# Patient Record
Sex: FEMALE | Race: WHITE | NOT HISPANIC OR LATINO | Employment: FULL TIME | URBAN - METROPOLITAN AREA
[De-identification: names, ages, dates, MRNs, and addresses within clinical notes are randomized per-mention and may not be internally consistent; named-entity substitution may affect disease eponyms.]

---

## 2017-03-31 ENCOUNTER — OFFICE VISIT (OUTPATIENT)
Dept: FAMILY MEDICINE | Facility: CLINIC | Age: 37
End: 2017-03-31
Payer: COMMERCIAL

## 2017-03-31 VITALS
DIASTOLIC BLOOD PRESSURE: 60 MMHG | BODY MASS INDEX: 24.59 KG/M2 | SYSTOLIC BLOOD PRESSURE: 94 MMHG | OXYGEN SATURATION: 96 % | HEART RATE: 63 BPM | TEMPERATURE: 97.9 F | RESPIRATION RATE: 16 BRPM | WEIGHT: 141 LBS

## 2017-03-31 DIAGNOSIS — K42.0 UMBILICAL HERNIA WITH OBSTRUCTION: ICD-10-CM

## 2017-03-31 DIAGNOSIS — L29.9 PRURITIC DISORDER: Primary | ICD-10-CM

## 2017-03-31 DIAGNOSIS — M62.08 RECTUS DIASTASIS: ICD-10-CM

## 2017-03-31 PROCEDURE — 99214 OFFICE O/P EST MOD 30 MIN: CPT | Performed by: FAMILY MEDICINE

## 2017-03-31 RX ORDER — KETOCONAZOLE 20 MG/G
CREAM TOPICAL 2 TIMES DAILY
Qty: 15 G | Refills: 1 | Status: SHIPPED | OUTPATIENT
Start: 2017-03-31 | End: 2017-07-11

## 2017-03-31 ASSESSMENT — ENCOUNTER SYMPTOMS
DIARRHEA: 0
CONSTITUTIONAL NEGATIVE: 1
CONSTIPATION: 0
ABDOMINAL PAIN: 1
BLOOD IN STOOL: 0
BACK PAIN: 0

## 2017-03-31 NOTE — MR AVS SNAPSHOT
After Visit Summary   3/31/2017    Keisha Espinoza    MRN: [id number]           Patient Information     Date Of Birth          1980        Visit Information        Provider Department      3/31/2017 10:40 AM Eric Wilson MD [facility]        Today's Diagnoses     Pruritic disorder    -  1    Rectus diastasis        Umbilical hernia with obstruction           Follow-ups after your visit        Follow-up notes from your care team     Return in about 1 month (around 4/30/2017).      Who to contact     If you have questions or need follow up information about today's clinic visit or your schedule please contact [facility] directly at [phone].  Normal or non-critical lab and imaging results will be communicated to you by MyChart, letter or phone within 4 business days after the clinic has received the results. If you do not hear from us within 7 days, please contact the clinic through Aternity[employer] or phone. If you have a critical or abnormal lab result, we will notify you by phone as soon as possible.  Submit refill requests through [employer] or call your pharmacy and they will forward the refill request to us. Please allow 3 business days for your refill to be completed.          Additional Information About Your Visit        MyChart Information     [employer] gives you secure access to your electronic health record. If you see a primary care provider, you can also send messages to your care team and make appointments. If you have questions, please call your primary care clinic.  If you do not have a primary care provider, please call [phone] and they will assist you.        Care EveryWhere ID     This is your Care EveryWhere ID. This could be used by other organizations to access your [place] medical records  [id number]        Your Vitals Were     Pulse Temperature Respirations Pulse Oximetry Breastfeeding? BMI (Body Mass Index)    63 97.9  F (36.6  C)  (Oral) 16 96% Yes 24.59 kg/m2       Blood Pressure from Last 3 Encounters:   03/31/17 94/60   12/02/16 100/58   11/29/16 102/62    Weight from Last 3 Encounters:   03/31/17 141 lb (64 kg)   12/02/16 141 lb 3.2 oz (64 kg)   11/29/16 140 lb (63.5 kg)              Today, you had the following     No orders found for display         Today's Medication Changes          These changes are accurate as of: 3/31/17 11:10 AM.  If you have any questions, ask your nurse or doctor.               Start taking these medicines.        Dose/Directions    ketoconazole 2 % cream   Commonly known as:  NIZORAL   Used for:  Pruritic disorder   Started by:  Eric Wilson MD        Apply topically 2 times daily   Quantity:  15 g   Refills:  1            Where to get your medicines      These medications were sent to [facility]/pharmacy #[id number] - [place], [place] - [address]  [address]   [address] [facility] [address]     Phone:  [phone]     ketoconazole 2 % cream                Primary Care Provider Office Phone # Fax #    Eric Wilson [phone]       [facility] [address] [facility] [address]        Thank you!     Thank you for choosing [facility]  for your care. Our goal is always to provide you with excellent care. Hearing back from our patients is one way we can continue to improve our services. Please take a few minutes to complete the written survey that you may receive in the mail after your visit with us. Thank you!             Your Updated Medication List - Protect others around you: Learn how to safely use, store and throw away your medicines at www.disposemymeds.org.          This list is accurate as of: 3/31/17 11:10 AM.  Always use your most recent med list.                   Brand Name Dispense Instructions for use    breast pump Misc     1 each    1 each daily       ketoconazole 2 % cream    NIZORAL    15 g    Apply topically 2 times daily        Potassium Chloride ER 20 MEQ Tbcr     30 tablet    Take 1 tablet (20 mEq) by mouth daily       PRENATAL VITAMIN PO      Take 1 tablet by mouth.

## 2017-03-31 NOTE — PROGRESS NOTES
HPI    SUBJECTIVE:                                                    Keisha Espinoza is a 36 year old female who presents to clinic today for the following health issues:      Musculoskeletal problem/pain      Duration: greater than 6 months    Description  Location: bottom of right foot    Intensity:  mild    Accompanying signs and symptoms: extreme itching, especially at night time, that wont go away.    History  Previous similar problem: no   Previous evaluation:  none    Precipitating or alleviating factors:  Trauma or overuse: no   Aggravating factors include: none    Therapies tried and outcome: coconut oil, lotions     Patient has concern about umbilical since giving birth 6 months ago.    Does not see any sort of rash or eloy.  Only happens at night.  First noticed while pregnant, but feels it's been going on since before that.  Only this one spot on this one foot.  Has been using coconut oil, not helpful.    Now has an outie since delivery, firm lump.  No pain in umbilicus, change in bowel habits, bloddy stool.  Second delivery    Review of Systems   Constitutional: Negative.    Gastrointestinal: Positive for abdominal pain. Negative for blood in stool, constipation, diarrhea and melena.   Genitourinary: Negative.    Musculoskeletal: Negative for back pain.   Skin: Positive for rash. Negative for itching.         Physical Exam   Constitutional: She is oriented to person, place, and time and well-developed, well-nourished, and in no distress.   Abdominal: Normal appearance and bowel sounds are normal. There is no tenderness. No hernia. Hernia confirmed negative in the ventral area, confirmed negative in the right inguinal area and confirmed negative in the left inguinal area.   <1cm umbilical hernia with easily reducible omental protrusion.   Neurological: She is alert and oriented to person, place, and time.   Skin: Skin is warm and dry.   No illumination with woods lamp on sole of feet.  No lesion, rash    Vitals reviewed.    (L29.9) Pruritic disorder  (primary encounter diagnosis)  Comment: f not helpful will tiral steroid  Plan: ketoconazole (NIZORAL) 2 % cream            (M62.08) Rectus diastasis  Comment: post pregnancy, unmaksed small umbilical hernia  Plan:     (K42.0) Umbilical hernia with obstruction  Comment: minor, likely unmasked by diasthesis  Plan: observe      RTC in 1m    Eric Wilson MD

## 2017-04-10 ENCOUNTER — OFFICE VISIT (OUTPATIENT)
Dept: FAMILY MEDICINE | Facility: CLINIC | Age: 37
End: 2017-04-10
Payer: COMMERCIAL

## 2017-04-10 VITALS
WEIGHT: 141 LBS | DIASTOLIC BLOOD PRESSURE: 64 MMHG | RESPIRATION RATE: 16 BRPM | SYSTOLIC BLOOD PRESSURE: 100 MMHG | HEART RATE: 68 BPM | TEMPERATURE: 97.9 F | OXYGEN SATURATION: 97 % | BODY MASS INDEX: 24.59 KG/M2

## 2017-04-10 DIAGNOSIS — R11.0 NAUSEA: Primary | ICD-10-CM

## 2017-04-10 LAB
ALBUMIN UR-MCNC: NEGATIVE MG/DL
APPEARANCE UR: CLEAR
BASOPHILS # BLD AUTO: 0 10E9/L (ref 0–0.2)
BASOPHILS NFR BLD AUTO: 0.5 %
BETA HCG QUAL IFA URINE: NEGATIVE
BILIRUB UR QL STRIP: NEGATIVE
COLOR UR AUTO: YELLOW
DIFFERENTIAL METHOD BLD: NORMAL
EOSINOPHIL # BLD AUTO: 0.6 10E9/L (ref 0–0.7)
EOSINOPHIL NFR BLD AUTO: 7.7 %
ERYTHROCYTE [DISTWIDTH] IN BLOOD BY AUTOMATED COUNT: 11.2 % (ref 10–15)
GLUCOSE UR STRIP-MCNC: NEGATIVE MG/DL
HCT VFR BLD AUTO: 40.9 % (ref 35–47)
HGB BLD-MCNC: 13.7 G/DL (ref 11.7–15.7)
HGB UR QL STRIP: NEGATIVE
KETONES UR STRIP-MCNC: NEGATIVE MG/DL
LEUKOCYTE ESTERASE UR QL STRIP: NEGATIVE
LYMPHOCYTES # BLD AUTO: 2.3 10E9/L (ref 0.8–5.3)
LYMPHOCYTES NFR BLD AUTO: 30.5 %
MCH RBC QN AUTO: 30.9 PG (ref 26.5–33)
MCHC RBC AUTO-ENTMCNC: 33.5 G/DL (ref 31.5–36.5)
MCV RBC AUTO: 92 FL (ref 78–100)
MONOCYTES # BLD AUTO: 0.5 10E9/L (ref 0–1.3)
MONOCYTES NFR BLD AUTO: 6.9 %
NEUTROPHILS # BLD AUTO: 4.1 10E9/L (ref 1.6–8.3)
NEUTROPHILS NFR BLD AUTO: 54.4 %
NITRATE UR QL: NEGATIVE
PH UR STRIP: 7 PH (ref 5–7)
PLATELET # BLD AUTO: 224 10E9/L (ref 150–450)
RBC # BLD AUTO: 4.44 10E12/L (ref 3.8–5.2)
SP GR UR STRIP: 1.01 (ref 1–1.03)
URN SPEC COLLECT METH UR: NORMAL
UROBILINOGEN UR STRIP-ACNC: 0.2 EU/DL (ref 0.2–1)
WBC # BLD AUTO: 7.5 10E9/L (ref 4–11)

## 2017-04-10 PROCEDURE — 85025 COMPLETE CBC W/AUTO DIFF WBC: CPT | Performed by: FAMILY MEDICINE

## 2017-04-10 PROCEDURE — 36415 COLL VENOUS BLD VENIPUNCTURE: CPT | Performed by: FAMILY MEDICINE

## 2017-04-10 PROCEDURE — 81003 URINALYSIS AUTO W/O SCOPE: CPT | Performed by: FAMILY MEDICINE

## 2017-04-10 PROCEDURE — 80048 BASIC METABOLIC PNL TOTAL CA: CPT | Performed by: FAMILY MEDICINE

## 2017-04-10 PROCEDURE — 84703 CHORIONIC GONADOTROPIN ASSAY: CPT | Performed by: FAMILY MEDICINE

## 2017-04-10 PROCEDURE — 99213 OFFICE O/P EST LOW 20 MIN: CPT | Performed by: FAMILY MEDICINE

## 2017-04-10 ASSESSMENT — ENCOUNTER SYMPTOMS
FEVER: 0
BACK PAIN: 0
HEADACHES: 1
DIARRHEA: 0
BLOOD IN STOOL: 0
VOMITING: 0
COUGH: 0
CONSTIPATION: 0
NAUSEA: 1
ABDOMINAL PAIN: 0

## 2017-04-10 NOTE — MR AVS SNAPSHOT
After Visit Summary   4/10/2017    Keisha Espinoza    MRN: [id number]           Patient Information     Date Of Birth          1980        Visit Information        Provider Department      4/10/2017 7:40 AM Eric Wilson MD [facility]        Today's Diagnoses     Nausea    -  1       Follow-ups after your visit        Follow-up notes from your care team     Return in about 2 weeks (around 4/24/2017), or if symptoms worsen or fail to improve.      Who to contact     If you have questions or need follow up information about today's clinic visit or your schedule please contact [facility] directly at [phone].  Normal or non-critical lab and imaging results will be communicated to you by MyChart, letter or phone within 4 business days after the clinic has received the results. If you do not hear from us within 7 days, please contact the clinic through [employer] or phone. If you have a critical or abnormal lab result, we will notify you by phone as soon as possible.  Submit refill requests through [employer] or call your pharmacy and they will forward the refill request to us. Please allow 3 business days for your refill to be completed.          Additional Information About Your Visit        MyChart Information     [employer] gives you secure access to your electronic health record. If you see a primary care provider, you can also send messages to your care team and make appointments. If you have questions, please call your primary care clinic.  If you do not have a primary care provider, please call [phone] and they will assist you.        Care EveryWhere ID     This is your Care EveryWhere ID. This could be used by other organizations to access your [place] medical records  [id number]        Your Vitals Were     Pulse Temperature Respirations Pulse Oximetry Breastfeeding? BMI (Body Mass Index)    68 97.9  F (36.6  C) (Oral) 16 97% Yes 24.59 kg/m2        Blood Pressure from Last 3 Encounters:   04/10/17 100/64   03/31/17 94/60   12/02/16 100/58    Weight from Last 3 Encounters:   04/10/17 141 lb (64 kg)   03/31/17 141 lb (64 kg)   12/02/16 141 lb 3.2 oz (64 kg)              We Performed the Following     *UA reflex to Microscopic     Basic metabolic panel     Beta HCG qual IFA urine     CBC with platelets and differential        Primary Care Provider Office Phone # Fax #    Eric Wilson [phone]       [facility] [address]  [facility] [address]        Thank you!     Thank you for choosing [facility]  for your care. Our goal is always to provide you with excellent care. Hearing back from our patients is one way we can continue to improve our services. Please take a few minutes to complete the written survey that you may receive in the mail after your visit with us. Thank you!             Your Updated Medication List - Protect others around you: Learn how to safely use, store and throw away your medicines at www.disposemymeds.org.          This list is accurate as of: 4/10/17  8:41 AM.  Always use your most recent med list.                   Brand Name Dispense Instructions for use    breast pump Misc     1 each    1 each daily       ketoconazole 2 % cream    NIZORAL    15 g    Apply topically 2 times daily       Potassium Chloride ER 20 MEQ Tbcr     30 tablet    Take 1 tablet (20 mEq) by mouth daily       PRENATAL VITAMIN PO      Take 1 tablet by mouth.

## 2017-04-10 NOTE — PROGRESS NOTES
HPI    SUBJECTIVE:                                                    Keisha Espinoza is a 37 year old female who presents to clinic today for the following health issues:      NAUSEA/DIZZINESS      Duration: couple weeks    Description (location/character/radiation): has been feeling nauseous, dizziness, stomach feels funny, jittery; more noticeable in the middle of the night when getting up with little one, and first thing in the morning     Intensity:  moderate    Accompanying signs and symptoms: weird feeling in head    History (similar episodes/previous evaluation): after giving birth    Precipitating or alleviating factors: None    Therapies tried and outcome: tylenol     Feeling poorly, nausea, HA, fatigue.  Feels a bit jittery.  No fever, diarrhea.  Up 1-2 times nightly with baby.  Nursing.  No period since delivery.  Using condoms for birth control.  Had a similar episode shortly after birth of sone last fall, feels this is worse.  In the last several days symtpoms are pretty stable.  No vaginal discahrge.  Has had flu shot this season.    Mood is fine - no concerns about depression, anxiety.    Review of Systems   Constitutional: Positive for malaise/fatigue. Negative for fever.   HENT: Negative for congestion.    Respiratory: Negative for cough.    Gastrointestinal: Positive for nausea. Negative for abdominal pain, blood in stool, constipation, diarrhea and vomiting.   Genitourinary: Negative.    Musculoskeletal: Negative for back pain.   Neurological: Positive for headaches.         Physical Exam   Constitutional: She is oriented to person, place, and time and well-developed, well-nourished, and in no distress.   Eyes: Conjunctivae and EOM are normal. Pupils are equal, round, and reactive to light.   Cardiovascular: Normal rate, regular rhythm and normal heart sounds.    Pulmonary/Chest: Effort normal and breath sounds normal.   Musculoskeletal: She exhibits no edema.   Neurological: She is alert and  oriented to person, place, and time. She has intact cranial nerves.   Skin: Skin is warm and dry.   Vitals reviewed.    (R11.0) Nausea  (primary encounter diagnosis)  Comment: suspect viral, likley mild flu.  Cont obs and supportive cares  Plan: Beta HCG qual IFA urine, Basic metabolic panel,        CBC with platelets and differential, *UA reflex        to Microscopic              RTC in 2w    Eric Wilson MD

## 2017-04-11 LAB
ANION GAP SERPL CALCULATED.3IONS-SCNC: 7 MMOL/L (ref 3–14)
BUN SERPL-MCNC: 15 MG/DL (ref 7–30)
CALCIUM SERPL-MCNC: 9.3 MG/DL (ref 8.5–10.1)
CHLORIDE SERPL-SCNC: 107 MMOL/L (ref 94–109)
CO2 SERPL-SCNC: 28 MMOL/L (ref 20–32)
CREAT SERPL-MCNC: 0.72 MG/DL (ref 0.52–1.04)
GFR SERPL CREATININE-BSD FRML MDRD: ABNORMAL ML/MIN/1.7M2
GLUCOSE SERPL-MCNC: 65 MG/DL (ref 70–99)
POTASSIUM SERPL-SCNC: 4 MMOL/L (ref 3.4–5.3)
SODIUM SERPL-SCNC: 142 MMOL/L (ref 133–144)

## 2017-04-18 ENCOUNTER — TELEPHONE (OUTPATIENT)
Dept: OBGYN | Facility: CLINIC | Age: 37
End: 2017-04-18

## 2017-04-19 ENCOUNTER — OFFICE VISIT (OUTPATIENT)
Dept: FAMILY MEDICINE | Facility: CLINIC | Age: 37
End: 2017-04-19
Payer: COMMERCIAL

## 2017-04-19 VITALS
TEMPERATURE: 98.2 F | RESPIRATION RATE: 14 BRPM | WEIGHT: 144 LBS | SYSTOLIC BLOOD PRESSURE: 94 MMHG | DIASTOLIC BLOOD PRESSURE: 62 MMHG | BODY MASS INDEX: 25.11 KG/M2 | OXYGEN SATURATION: 97 % | HEART RATE: 66 BPM

## 2017-04-19 DIAGNOSIS — K42.9 UMBILICAL HERNIA WITHOUT OBSTRUCTION AND WITHOUT GANGRENE: Primary | ICD-10-CM

## 2017-04-19 PROCEDURE — 99213 OFFICE O/P EST LOW 20 MIN: CPT | Performed by: FAMILY MEDICINE

## 2017-04-19 ASSESSMENT — ENCOUNTER SYMPTOMS
NAUSEA: 0
VOMITING: 0
DIARRHEA: 0
CARDIOVASCULAR NEGATIVE: 1
RESPIRATORY NEGATIVE: 1
ABDOMINAL PAIN: 1
CONSTITUTIONAL NEGATIVE: 1
CONSTIPATION: 0

## 2017-04-19 NOTE — TELEPHONE ENCOUNTER
Patient would like to see if Yamile can work patient in to be seen soon. It's been 6 months since she gave birth and it is still painful down there. She states it is painful to have intercourse. Please call to discuss.    Dana KISER  Central Scheduler

## 2017-04-19 NOTE — MR AVS SNAPSHOT
After Visit Summary   4/19/2017    Keisha Espinoza    MRN: [id number]           Patient Information     Date Of Birth          1980        Visit Information        Provider Department      4/19/2017 2:00 PM Eric Wilson MD [facility]        Today's Diagnoses     Umbilical hernia without obstruction and without gangrene    -  1       Follow-ups after your visit        Additional Services     GENERAL SURG ADULT REFERRAL       Your provider has referred you to: [facility]: [facility] [place] [phone]   [email or web address]    Please be aware that coverage of these services is subject to the terms and limitations of your health insurance plan.  Call member services at your health plan with any benefit or coverage questions.      Please bring the following with you to your appointment:    (1) Any X-Rays, CTs or MRIs which have been performed.  Contact the facility where they were done to arrange for  prior to your scheduled appointment.   (2) List of current medications   (3) This referral request   (4) Any documents/labs given to you for this referral                  Your next 10 appointments already scheduled     Apr 26, 2017  8:30 AM CDT   SHORT with Tho Cunha MD   [facility] ([facility])    [address] [place] [id number] [phone]              Who to contact     If you have questions or need follow up information about today's clinic visit or your schedule please contact [facility] directly at [phone].  Normal or non-critical lab and imaging results will be communicated to you by MyChart, letter or phone within 4 business days after the clinic has received the results. If you do not hear from us within 7 days, please contact the clinic through MyChart or phone. If you have a critical or abnormal lab result, we will  notify you by phone as soon as possible.  Submit refill requests through [employer] or call your pharmacy and they will forward the refill request to us. Please allow 3 business days for your refill to be completed.          Additional Information About Your Visit        [employer] Information     [employer] gives you secure access to your electronic health record. If you see a primary care provider, you can also send messages to your care team and make appointments. If you have questions, please call your primary care clinic.  If you do not have a primary care provider, please call [phone] and they will assist you.        Care EveryWhere ID     This is your Care EveryWhere ID. This could be used by other organizations to access your [place] medical records  [id number]        Your Vitals Were     Pulse Temperature Respirations Pulse Oximetry Breastfeeding? BMI (Body Mass Index)    66 98.2  F (36.8  C) (Oral) 14 97% Yes 25.11 kg/m2       Blood Pressure from Last 3 Encounters:   04/19/17 94/62   04/10/17 100/64   03/31/17 94/60    Weight from Last 3 Encounters:   04/19/17 144 lb (65.3 kg)   04/10/17 141 lb (64 kg)   03/31/17 141 lb (64 kg)              We Performed the Following     GENERAL SURG ADULT REFERRAL        Primary Care Provider Office Phone # Fax #    Eric Michael Wilson [phone]       [facility] [address]  [address] [facility] [address]        Thank you!     Thank you for choosing [facility]  for your care. Our goal is always to provide you with excellent care. Hearing back from our patients is one way we can continue to improve our services. Please take a few minutes to complete the written survey that you may receive in the mail after your visit with us. Thank you!             Your Updated Medication List - Protect others around you: Learn how to safely use, store and throw away your medicines at www.disposemymeds.org.          This list is accurate as of:  4/19/17  2:43 PM.  Always use your most recent med list.                   Brand Name Dispense Instructions for use    breast pump Misc     1 each    1 each daily       ketoconazole 2 % cream    NIZORAL    15 g    Apply topically 2 times daily       Potassium Chloride ER 20 MEQ Tbcr     30 tablet    Take 1 tablet (20 mEq) by mouth daily       PRENATAL VITAMIN PO      Take 1 tablet by mouth.

## 2017-04-19 NOTE — PROGRESS NOTES
HPI    SUBJECTIVE:                                                    Keisha Espinoza is a 37 year old female who presents to clinic today for the following health issues:      Recheck on abdominal pain, uncomfortable feeling with nursing, an irritating feeling, tenderness; no throbbing pain.  No periods since delivery.  No vaginal discharge.  Some dyspareunia, sensitive when washing.  Some entroitus pain with intercourse.  Does feel this is linked to umbilical hernia.  No change in bowel habits.        Review of Systems   Constitutional: Negative.    Respiratory: Negative.    Cardiovascular: Negative.    Gastrointestinal: Positive for abdominal pain. Negative for constipation, diarrhea, nausea and vomiting.         Physical Exam   Constitutional: She is well-developed, well-nourished, and in no distress.   Abdominal: Normal appearance and bowel sounds are normal. There is no tenderness. There is no CVA tenderness.   3-4 mm umbilical hernia, tender.  Also rectus diathesis.   Skin: Skin is warm and dry.   Vitals reviewed.    (K42.9) Umbilical hernia without obstruction and without gangrene  (primary encounter diagnosis)  Comment:   Plan: GENERAL SURG ADULT REFERRAL              RTC in 1m    Eric Wilson MD

## 2017-04-19 NOTE — NURSING NOTE
"Chief Complaint   Patient presents with     RECHECK     abdominal pain       Initial BP 94/62 (BP Location: Right arm, Patient Position: Chair, Cuff Size: Adult Regular)  Pulse 66  Temp 98.2  F (36.8  C) (Oral)  Resp 14  Wt 144 lb (65.3 kg)  SpO2 97%  Breastfeeding? Yes  BMI 25.11 kg/m2 Estimated body mass index is 25.11 kg/(m^2) as calculated from the following:    Height as of 12/2/16: 5' 3.5\" (1.613 m).    Weight as of this encounter: 144 lb (65.3 kg).  Medication Reconciliation: complete   Pricila Groves MA      "

## 2017-04-24 ENCOUNTER — OFFICE VISIT (OUTPATIENT)
Dept: SURGERY | Facility: CLINIC | Age: 37
End: 2017-04-24
Payer: COMMERCIAL

## 2017-04-24 VITALS
DIASTOLIC BLOOD PRESSURE: 78 MMHG | WEIGHT: 141 LBS | OXYGEN SATURATION: 96 % | HEIGHT: 64 IN | SYSTOLIC BLOOD PRESSURE: 122 MMHG | BODY MASS INDEX: 24.07 KG/M2 | HEART RATE: 66 BPM

## 2017-04-24 DIAGNOSIS — K42.9 UMBILICAL HERNIA WITHOUT OBSTRUCTION AND WITHOUT GANGRENE: Primary | ICD-10-CM

## 2017-04-24 DIAGNOSIS — M62.08 RECTUS DIASTASIS: ICD-10-CM

## 2017-04-24 PROCEDURE — 99202 OFFICE O/P NEW SF 15 MIN: CPT | Performed by: SURGERY

## 2017-04-24 NOTE — PROGRESS NOTES
Keisha is a 37 year old White female who presents for hernia evaluation. The patient has noticed a bulge. Pain has been present. This hernia began during her last pregnancy . She delivered 7 months ago and is currently breastfeeding. No definite plans for future pregnancy.    Pt has not had previous ABD surgery including none.  Patient does not report that increased activity/lifting causes pain. Employment does not require lifting.    ConstipationNo  DysuriaNo  CoughNo  Smoking No,quit  DiabetesNo    Pt's chart has been reviewed for PMH, PSH, allergies, medications and social history.    ROS:  Pulm:  No shortness of breath, dyspnea on exertion, cough, or hemoptysis  CV:  negative  ABD:  See chief complaint  :  negative    Physical exam:  Patient able to get up on table without difficulty.  Head eyes, nose and mouth within normal limits.  Sclera are clear  No supraclavicular or cervical adenopathy noted.  Neck shows no gross mass  Respirations are regular and non labored  Abdomen is abdomen is soft without significant tenderness, masses, organomegaly or guarding  bowel sounds are positive and no caput medusa noted. Supra-umbilical piercing site  Hernia  Is present at the umbilicus. Reducible and minimally tender fat present. Significant diastasis with no fascial defect/hernia    Imaging  CT No      Assesment: umbilical hernia, rectus diastasis  Plan: Discussed observation, external support, possible progression, incarceration and strangulation signs and symptoms and need for immediate treatment if they develop.  Discussed surgery in detail, including risk, benefits, complications, incision/cosmetics, mesh, infection (possibly requiring removal of the mesh), chronic pain, involvement of inta-abdominal organs, lifting and activity limits after surgery. Gave literature to review. Will schedule surgery in near future  Time spent with the patient with greater that 50% of the time in discussion was 20-30  minutes.    René Carrasquillo MD  4/24/2017 2:57 PM    Please route or send letter to:  Primary Care Provider (PCP) and Include Progress Note

## 2017-04-24 NOTE — MR AVS SNAPSHOT
After Visit Summary   4/24/2017    Keisha Espinoza    MRN: [id number]           Patient Information     Date Of Birth          1980        Visit Information        Provider Department      4/24/2017 2:30 PM René Carrasquillo MD [facility]      Today's Diagnoses     Umbilical hernia without obstruction and without gangrene    -  1    Rectus diastasis           Follow-ups after your visit        Your next 10 appointments already scheduled     Apr 26, 2017  8:30 AM CDT   SHORT with Tho Cunha MD   [facility] ([facility])    [address] [place] [address] [address]  [facility] [address] [phone]              Who to contact     If you have questions or need follow up information about today's clinic visit or your schedule please contact [facility] directly at [phone].  Normal or non-critical lab and imaging results will be communicated to you by MyChart, letter or phone within 4 business days after the clinic has received the results. If you do not hear from us within 7 days, please contact the clinic through MobPartner[employer] or phone. If you have a critical or abnormal lab result, we will notify you by phone as soon as possible.  Submit refill requests through [employer] or call your pharmacy and they will forward the refill request to us. Please allow 3 business days for your refill to be completed.          Additional Information About Your Visit        MyChart Information     [employer] gives you secure access to your electronic health record. If you see a primary care provider, you can also send messages to your care team and make appointments. If you have questions, please call your primary care clinic.  If you do not have a primary care provider, please call [phone] and they will assist you.        Care EveryWhere ID     This is your Care EveryWhere ID. This could be used by  "other organizations to access your [facility] medical records  [id number]        Your Vitals Were     Pulse Height Pulse Oximetry BMI (Body Mass Index)          66 5' 4\" (1.626 m) 96% 24.2 kg/m2         Blood Pressure from Last 3 Encounters:   04/24/17 122/78   04/19/17 94/62   04/10/17 100/64    Weight from Last 3 Encounters:   04/24/17 141 lb (64 kg)   04/19/17 144 lb (65.3 kg)   04/10/17 141 lb (64 kg)              Today, you had the following     No orders found for display       Primary Care Provider Office Phone # Fax #    Eric Wilson [phone]       [facility] [address]  [place] [facility] [address]        Thank you!     Thank you for choosing [facility]  for your care. Our goal is always to provide you with excellent care. Hearing back from our patients is one way we can continue to improve our services. Please take a few minutes to complete the written survey that you may receive in the mail after your visit with us. Thank you!             Your Updated Medication List - Protect others around you: Learn how to safely use, store and throw away your medicines at www.disposemymeds.org.          This list is accurate as of: 4/24/17  3:04 PM.  Always use your most recent med list.                   Brand Name Dispense Instructions for use    breast pump Misc     1 each    1 each daily       ketoconazole 2 % cream    NIZORAL    15 g    Apply topically 2 times daily       Potassium Chloride ER 20 MEQ Tbcr     30 tablet    Take 1 tablet (20 mEq) by mouth daily       PRENATAL VITAMIN PO      Take 1 tablet by mouth.         "

## 2017-04-24 NOTE — LETTER
2017    RE:  Keisha Espinoza-:  [birth year]    Keisha is a 37 year old White female who presents for hernia evaluation. The patient has noticed a bulge. Pain has been present. This hernia began during her last pregnancy . She delivered 7 months ago and is currently breastfeeding. No definite plans for future pregnancy.  Pt has not had previous ABD surgery including none. Patient does not report that increased activity/lifting causes pain. Employment does not require lifting.     ConstipationNo  DysuriaNo  CoughNo  Smoking No,quit  DiabetesNo     Pt's chart has been reviewed for PMH, PSH, allergies, medications and social history.     ROS:  Pulm: No shortness of breath, dyspnea on exertion, cough, or hemoptysis  CV: negative  ABD: See chief complaint  : negative     Physical exam: Patient able to get up on table without difficulty.  Head eyes, nose and mouth within normal limits.  Sclera are clear  No supraclavicular or cervical adenopathy noted.  Neck shows no gross mass  Respirations are regular and non labored  Abdomen is abdomen is soft without significant tenderness, masses, organomegaly or guarding bowel sounds are positive and no caput medusa noted. Supra-umbilical piercing site  Hernia Is present at the umbilicus. Reducible and minimally tender fat present. Significant diastasis with no fascial defect/hernia     Imaging CT No     Assesment: umbilical hernia, rectus diastasis  Plan: Discussed observation, external support, possible progression, incarceration and strangulation signs and symptoms and need for immediate treatment if they develop.  Discussed surgery in detail, including risk, benefits, complications, incision/cosmetics, mesh, infection (possibly requiring removal of the mesh), chronic pain, involvement of inta-abdominal organs, lifting and activity limits after surgery. Gave literature to review. Will schedule surgery in near future       René Carrasquillo MD

## 2017-04-26 ENCOUNTER — OFFICE VISIT (OUTPATIENT)
Dept: OBGYN | Facility: CLINIC | Age: 37
End: 2017-04-26
Payer: COMMERCIAL

## 2017-04-26 VITALS
DIASTOLIC BLOOD PRESSURE: 68 MMHG | WEIGHT: 141 LBS | SYSTOLIC BLOOD PRESSURE: 106 MMHG | BODY MASS INDEX: 24.2 KG/M2 | HEART RATE: 84 BPM

## 2017-04-26 DIAGNOSIS — N95.2 ATROPHIC VAGINITIS: Primary | ICD-10-CM

## 2017-04-26 DIAGNOSIS — R10.2 VAGINAL PAIN: ICD-10-CM

## 2017-04-26 LAB
MICRO REPORT STATUS: ABNORMAL
SPECIMEN SOURCE: ABNORMAL
WET PREP SPEC: ABNORMAL

## 2017-04-26 PROCEDURE — 99213 OFFICE O/P EST LOW 20 MIN: CPT | Performed by: OBSTETRICS & GYNECOLOGY

## 2017-04-26 PROCEDURE — 87210 SMEAR WET MOUNT SALINE/INK: CPT | Performed by: OBSTETRICS & GYNECOLOGY

## 2017-04-26 RX ORDER — NYSTATIN AND TRIAMCINOLONE ACETONIDE 100000; 1 [USP'U]/G; MG/G
OINTMENT TOPICAL 2 TIMES DAILY
Qty: 30 G | Refills: 1 | Status: SHIPPED | OUTPATIENT
Start: 2017-04-26 | End: 2017-07-11

## 2017-04-26 RX ORDER — ESTRADIOL 0.1 MG/G
2 CREAM VAGINAL
Qty: 42.5 G | Refills: 3 | Status: SHIPPED | OUTPATIENT
Start: 2017-04-26 | End: 2017-07-11

## 2017-04-26 NOTE — NURSING NOTE
"Chief Complaint   Patient presents with     Vaginal Problem     pain with intercourse since delivery of baby 7 months ago - soreness towards the bottom of the vaginal opening       Initial /68 (BP Location: Right arm, Patient Position: Chair, Cuff Size: Adult Regular)  Pulse 84  Wt 141 lb (64 kg)  Breastfeeding? Yes  BMI 24.2 kg/m2 Estimated body mass index is 24.2 kg/(m^2) as calculated from the following:    Height as of 4/24/17: 5' 4\" (1.626 m).    Weight as of this encounter: 141 lb (64 kg).  Medication Reconciliation: complete      Nurse assisted visit.  Keisha Landaverde MA.    "

## 2017-04-26 NOTE — MR AVS SNAPSHOT
After Visit Summary   4/26/2017    Keisha Espinoza    MRN: [id number]           Patient Information     Date Of Birth          1980        Visit Information        Provider Department      4/26/2017 8:30 AM Tho Cunha MD [facility]        Today's Diagnoses     Atrophic vaginitis    -  1    Vaginal pain           Follow-ups after your visit        Who to contact     If you have questions or need follow up information about today's clinic visit or your schedule please [facility] directly at [phone].  Normal or non-critical lab and imaging results will be communicated to you by Igglihart, letter or phone within 4 business days after the clinic has received the results. If you do not hear from us within 7 days, please contact the clinic through [employer] or phone. If you have a critical or abnormal lab result, we will notify you by phone as soon as possible.  Submit refill requests through [employer] or call your pharmacy and they will forward the refill request to us. Please allow 3 business days for your refill to be completed.          Additional Information About Your Visit        MyChart Information     [employer] gives you secure access to your electronic health record. If you see a primary care provider, you can also send messages to your care team and make appointments. If you have questions, please call your primary care clinic.  If you do not have a primary care provider, please call [phone] and they will assist you.        Care EveryWhere ID     This is your Care EveryWhere ID. This could be used by other organizations to access your [place] medical records  [id number]        Your Vitals Were     Pulse Breastfeeding? BMI (Body Mass Index)             84 Yes 24.2 kg/m2          Blood Pressure from Last 3 Encounters:   04/26/17 106/68   04/24/17 122/78   04/19/17 94/62    Weight from Last 3 Encounters:   04/26/17 141 lb (64 kg)   04/24/17 141 lb  (64 kg)   04/19/17 144 lb (65.3 kg)              We Performed the Following     Wet prep          Today's Medication Changes          These changes are accurate as of: 4/26/17  9:04 AM.  If you have any questions, ask your nurse or doctor.               Start taking these medicines.        Dose/Directions    estradiol 0.1 MG/GM cream   Commonly known as:  ESTRACE   Used for:  Atrophic vaginitis   Started by:  Tho Cunha MD        Dose:  2 g   Place 2 g vaginally three times a week Please 2 grams daily for 2 weeks; then three times per week   Quantity:  42.5 g   Refills:  3       nystatin-triamcinolone ointment   Commonly known as:  MYCOLOG   Used for:  Atrophic vaginitis   Started by:  Tho Cunha MD        Apply topically 2 times daily   Quantity:  30 g   Refills:  1            Where to get your medicines      These medications were sent to [facility] [place] [facility] - [address]   [address]  [address], [place] [address]     Phone:  [phone]     estradiol 0.1 MG/GM cream    nystatin-triamcinolone ointment                Primary Care Provider Office Phone # Fax #    Eric Michael Wilson [phone]       [facility] [address]  [facility] [address]        Thank you!     Thank you for choosing [facility]  for your care. Our goal is always to provide you with excellent care. Hearing back from our patients is one way we can continue to improve our services. Please take a few minutes to complete the written survey that you may receive in the mail after your visit with us. Thank you!             Your Updated Medication List - Protect others around you: Learn how to safely use, store and throw away your medicines at www.disposemymeds.org.          This list is accurate as of: 4/26/17  9:04 AM.  Always use your most recent med list.                   Brand Name Dispense Instructions for use    breast pump Misc     1  each    1 each daily       estradiol 0.1 MG/GM cream    ESTRACE    42.5 g    Place 2 g vaginally three times a week Please 2 grams daily for 2 weeks; then three times per week       ketoconazole 2 % cream    NIZORAL    15 g    Apply topically 2 times daily       nystatin-triamcinolone ointment    MYCOLOG    30 g    Apply topically 2 times daily       Potassium Chloride ER 20 MEQ Tbcr     30 tablet    Take 1 tablet (20 mEq) by mouth daily       PRENATAL VITAMIN PO      Take 1 tablet by mouth.

## 2017-04-26 NOTE — PROGRESS NOTES
SUBJECTIVE:  Keisha Espinoza is an 37 year old G:3 P2 woman who presents for evaluation of pain with intercourse      -present since delivery 10/2016      -breast feeding      -begins with intromission          Past Medical History:   Diagnosis Date     History of tobacco abuse      Palpitations      Sinus bradycardia      Past Surgical History:   Procedure Laterality Date     EXTRACTION(S) DENTAL       Current Outpatient Prescriptions   Medication     Prenatal Vit-Fe Sulfate-FA (PRENATAL VITAMIN OR)     ketoconazole (NIZORAL) 2 % cream     Potassium Chloride ER 20 MEQ TBCR     Misc. Devices (BREAST PUMP) MISC     No current facility-administered medications for this visit.      Allergies   Allergen Reactions     Sulfa Drugs Rash     Social History   Substance Use Topics     Smoking status: Former Smoker     Years: 10.00     Smokeless tobacco: Never Used      Comment: quit 2004     Alcohol use No      Comment: social       Review of Systems  CONSTITUTIONAL:NEGATIVE  EYES: NEGATIVE  ENT/MOUTH: NEGATIVE  RESP: NEGATIVE  CV: NEGATIVE  GI: NEGATIVE  : NEGATIVE    OBJECTIVE:  /68 (BP Location: Right arm, Patient Position: Chair, Cuff Size: Adult Regular)  Pulse 84  Wt 141 lb (64 kg)  Breastfeeding? Yes  BMI 24.2 kg/m2   EXAM:  GENERAL APPEARANCE: healthy, alert and no distress  ABDOMEN: soft, nontender, without hepatosplenomegaly or masses    Pelvic Exam:  Vulva: No external lesions, normal hair distribution, no adenopathy     -perineum intact  Vagina: atrophic in nature, no lesions  Cervix: Parous, smooth, pink, no visible lesions  Uterus: Normal size, anteverted, non-tender, mobile  Ovaries: No mass, non-tender, mobile      ASSESSMENT:  Atrophic vulvo-vaginitis    PLAN:  (R10.2) Vaginal pain  (primary encounter diagnosis)  Plan: Wet prep             PE: reviewed health maintenance including diet, regular exercise and periodic exams.  Health Maintenance   Topic Date Due     INFLUENZA VACCINE (SYSTEM  ASSIGNED)  09/01/2017     PAP Q3 YR  12/02/2019     HPV Q3 Years  12/02/2019     TETANUS IMMUNIZATION (SYSTEM ASSIGNED)  07/22/2026

## 2017-05-01 ENCOUNTER — OFFICE VISIT (OUTPATIENT)
Dept: FAMILY MEDICINE | Facility: CLINIC | Age: 37
End: 2017-05-01
Payer: COMMERCIAL

## 2017-05-01 VITALS
BODY MASS INDEX: 24.29 KG/M2 | RESPIRATION RATE: 20 BRPM | SYSTOLIC BLOOD PRESSURE: 98 MMHG | OXYGEN SATURATION: 96 % | TEMPERATURE: 98.1 F | HEART RATE: 74 BPM | WEIGHT: 141.5 LBS | DIASTOLIC BLOOD PRESSURE: 60 MMHG

## 2017-05-01 DIAGNOSIS — J06.9 UPPER RESPIRATORY TRACT INFECTION, UNSPECIFIED TYPE: ICD-10-CM

## 2017-05-01 DIAGNOSIS — J02.9 ACUTE PHARYNGITIS, UNSPECIFIED ETIOLOGY: Primary | ICD-10-CM

## 2017-05-01 LAB
DEPRECATED S PYO AG THROAT QL EIA: NORMAL
MICRO REPORT STATUS: NORMAL
SPECIMEN SOURCE: NORMAL

## 2017-05-01 PROCEDURE — 87081 CULTURE SCREEN ONLY: CPT | Performed by: PHYSICIAN ASSISTANT

## 2017-05-01 PROCEDURE — 87880 STREP A ASSAY W/OPTIC: CPT | Performed by: PHYSICIAN ASSISTANT

## 2017-05-01 PROCEDURE — 99213 OFFICE O/P EST LOW 20 MIN: CPT | Performed by: PHYSICIAN ASSISTANT

## 2017-05-01 NOTE — MR AVS SNAPSHOT
After Visit Summary   5/1/2017    Keisha Espinoza    MRN: [id number]           Patient Information     Date Of Birth          1980        Visit Information        Provider Department      5/1/2017 8:00 AM Tito Bedolla PA-C [facility]        Today's Diagnoses     Acute pharyngitis, unspecified etiology    -  1    Upper respiratory tract infection, unspecified type           Follow-ups after your visit        Your next 10 appointments already scheduled     May 26, 2017   Procedure with René Carrasquillo MD   [facility] (--)    [address]  [facility] [address]   [phone]            May 26, 2017  8:15 AM CDT   [facility] Same Day Surgery with René Carrasquillo MD, Jada Dover PA-C   [facility] ([facility])    [facility])   [phone]              Who to contact     If you have questions or need follow up information about today's clinic visit or your schedule please contact [facility] directly at [phone].  Normal or non-critical lab and imaging results will be communicated to you by [employer], letter or phone within 4 business days after the clinic has received the results. If you do not hear from us within 7 days, please contact the clinic through [employer] or phone. If you have a critical or abnormal lab result, we will notify you by phone as soon as possible.  Submit refill requests through [employer] or call your pharmacy and they will forward the refill request to us. Please allow 3 business days for your refill to be completed.          Additional Information About Your Visit        MyChart Information     [employer] gives you secure access to your electronic health record. If you see a primary care provider, you can also send messages to your care team and make appointments. If you have questions, please call  your primary care clinic.  If you do not have a primary care provider, please call [phone] and they will assist you.        Care EveryWhere ID     This is your Care EveryWhere ID. This could be used by other organizations to access your [place] medical records  [id number]        Your Vitals Were     Pulse Temperature Respirations Pulse Oximetry BMI (Body Mass Index)       74 98.1  F (36.7  C) (Oral) 20 96% 24.29 kg/m2        Blood Pressure from Last 3 Encounters:   05/01/17 98/60   04/26/17 106/68   04/24/17 122/78    Weight from Last 3 Encounters:   05/01/17 141 lb 8 oz (64.2 kg)   04/26/17 141 lb (64 kg)   04/24/17 141 lb (64 kg)              We Performed the Following     Beta strep group A culture     Strep, Rapid Screen        Primary Care Provider Office Phone # Fax #    Eric Micahel Wilson [phone]       [facility] [address]  [facility] [address]        Thank you!     Thank you for choosing [facility]  for your care. Our goal is always to provide you with excellent care. Hearing back from our patients is one way we can continue to improve our services. Please take a few minutes to complete the written survey that you may receive in the mail after your visit with us. Thank you!             Your Updated Medication List - Protect others around you: Learn how to safely use, store and throw away your medicines at www.disposemymeds.org.          This list is accurate as of: 5/1/17 11:31 AM.  Always use your most recent med list.                   Brand Name Dispense Instructions for use    breast pump Misc     1 each    1 each daily       estradiol 0.1 MG/GM cream    ESTRACE    42.5 g    Place 2 g vaginally three times a week Please 2 grams daily for 2 weeks; then three times per week       ketoconazole 2 % cream    NIZORAL    15 g    Apply topically 2 times daily       nystatin-triamcinolone ointment    MYCOLOG    30 g    Apply topically 2 times  daily       Potassium Chloride ER 20 MEQ Tbcr     30 tablet    Take 1 tablet (20 mEq) by mouth daily       PRENATAL VITAMIN PO      Take 1 tablet by mouth.

## 2017-05-01 NOTE — PROGRESS NOTES
SUBJECTIVE:                                                    Keisha Espinoza is a 37 year old female who presents to clinic today for the following health issues:      RESPIRATORY SYMPTOMS      Duration: last week    Description  sore throat, facial pain/pressure, fatigue/malaise and hoarse voice    Severity: moderate    Accompanying signs and symptoms: None    History (predisposing factors):  none    Precipitating or alleviating factors: None    Therapies tried and outcome:  none     Patient here with ST and some chest symptoms as well.  No fever.  Son is sick as well with fever and cough.  He was in last week for a strep test which was negative.      Problem list and histories reviewed & adjusted, as indicated.  Additional history: as documented      Reviewed and updated as needed this visit by clinical staff  Tobacco  Allergies  Meds  Problems  Med Hx  Surg Hx  Fam Hx  Soc Hx        Reviewed and updated as needed this visit by Provider         ROS:  Constitutional, HEENT, cardiovascular, pulmonary, gi and gu systems are negative, except as otherwise noted.    OBJECTIVE:                                                    BP 98/60 (BP Location: Right arm, Patient Position: Chair, Cuff Size: Adult Regular)  Pulse 74  Temp 98.1  F (36.7  C) (Oral)  Resp 20  Wt 141 lb 8 oz (64.2 kg)  SpO2 96%  BMI 24.29 kg/m2  Body mass index is 24.29 kg/(m^2).  GENERAL: healthy, alert and no distress  HENT: ear canals and TM's normal, nose and mouth without ulcers or lesions  NECK: no adenopathy, no asymmetry, masses, or scars and thyroid normal to palpation  RESP: lungs clear to auscultation - no rales, rhonchi or wheezes  MS: no gross musculoskeletal defects noted, no edema  SKIN: no suspicious lesions or rashes  PSYCH: mentation appears normal, affect normal/bright    Diagnostic Test Results:  Results for orders placed or performed in visit on 05/01/17 (from the past 24 hour(s))   Strep, Rapid Screen   Result  Value Ref Range    Specimen Description Throat     Rapid Strep A Screen       NEGATIVE: No Group A streptococcal antigen detected by immunoassay, await   culture report.      Micro Report Status FINAL 05/01/2017         ASSESSMENT/PLAN:                                                    1. Acute pharyngitis, unspecified etiology    - Strep, Rapid Screen  - Beta strep group A culture    2. Upper respiratory tract infection, unspecified type  -Recommended supportive cares including warm salt water gargles, Tylenol/Ibuprofen as directed OTC, rest, humidifier.  Follow-up in 2-3 days if symptoms are worsening or not improving as expected/discussed.            Tito Bedolla PA-C  [facility]

## 2017-05-02 LAB
BACTERIA SPEC CULT: NORMAL
MICRO REPORT STATUS: NORMAL
SPECIMEN SOURCE: NORMAL

## 2017-05-09 NOTE — PROGRESS NOTES
[address], [address]  [facility] [address]  [phone]  Dept: [phone]    PRE-OP EVALUATION:  Today's date: 5/10/2017    Keisha Espinoza (: 1980) presents for pre-operative evaluation assessment as requested by Dr. Carrasquillo.  She requires evaluation and anesthesia risk assessment prior to undergoing surgery/procedure for treatment of Hernia .  Proposed procedure: Herniarrhapy    Date of Surgery/ Procedure: 17  Time of Surgery/ Procedure: 730  Hospital/Surgical Facility: [facility]    Primary Physician: Eric Wilson  Type of Anesthesia Anticipated: to be determined    Patient has a Health Care Directive or Living Will:  NO    1. NO - Do you have a history of heart attack, stroke, stent, bypass or surgery on an artery in the head, neck, heart or legs?  2. NO - Do you ever have any pain or discomfort in your chest?  3. NO - Do you have a history of  Heart Failure?  4. NO - Are you troubled by shortness of breath when: walking on the level, up a slight hill or at night?  5. NO - Do you currently have a cold, bronchitis or other respiratory infection?  6. NO - Do you have a cough, shortness of breath or wheezing?  7. NO - Do you sometimes get pains in the calves of your legs when you walk?  8. YES - DO YOU OR ANYONE IN YOUR FAMILY HAVE PREVIOUS HISTORY OF BLOOD CLOTS? Sister had a blood clot presumed to be from OCP  9. NO - Do you or does anyone in your family have a serious bleeding problem such as prolonged bleeding following surgeries or cuts?  10. NO - Have you ever had problems with anemia or been told to take iron pills?  11. NO - Have you had any abnormal blood loss such as black, tarry or bloody stools, or abnormal vaginal bleeding?  12. NO - Have you ever had a blood transfusion?  13. NO - Have you or any of your relatives ever had problems with anesthesia?  14. YES - DO YOU HAVE SLEEP APNEA, EXCESSIVE SNORING OR DAYTIME DROWSINESS?    15. NO - Do you have any prosthetic heart valves?  16. NO - Do you have prosthetic joints?  17. NO - IS THERE ANY CHANCE THAT YOU MAY BE PREGNANT?       HPI:                                                      Brief HPI related to upcoming procedure: long standing umbilical hernia, painful but not otherwise bothersome.  Feeling well today, no fever, CP, dspnea, n/v, change in bowel habits.      See problem list for active medical problems.  Problems all longstanding and stable, except as noted/documented.  See ROS for pertinent symptoms related to these conditions.                                                                                                  .    MEDICAL HISTORY:                                                      Patient Active Problem List    Diagnosis Date Noted     History of tobacco abuse      Priority: Medium     CARDIOVASCULAR SCREENING; LDL GOAL LESS THAN 160 2012     Priority: Medium     Short IA-normal QRS complex syndrome 2012     Priority: Medium      Past Medical History:   Diagnosis Date     History of tobacco abuse      Palpitations      Sinus bradycardia      Past Surgical History:   Procedure Laterality Date     EXTRACTION(S) DENTAL       Current Outpatient Prescriptions   Medication Sig Dispense Refill     nystatin-triamcinolone (MYCOLOG) ointment Apply topically 2 times daily 30 g 1     estradiol (ESTRACE) 0.1 MG/GM cream Place 2 g vaginally three times a week Please 2 grams daily for 2 weeks; then three times per week 42.5 g 3     ketoconazole (NIZORAL) 2 % cream Apply topically 2 times daily 15 g 1     Potassium Chloride ER 20 MEQ TBCR Take 1 tablet (20 mEq) by mouth daily 30 tablet 3     Misc. Devices (BREAST PUMP) MISC 1 each daily 1 each 1     Prenatal Vit-Fe Sulfate-FA (PRENATAL VITAMIN OR) Take 1 tablet by mouth.       OTC products: None, except as noted above    Allergies   Allergen Reactions     Sulfa Drugs Rash      Latex Allergy: NO    Social  "History   Substance Use Topics     Smoking status: Former Smoker     Years: 10.00     Smokeless tobacco: Never Used      Comment: quit 2004     Alcohol use No      Comment: social     History   Drug Use No       REVIEW OF SYSTEMS:                                                    C: NEGATIVE for fever, chills, change in weight  E/M: NEGATIVE for ear, mouth and throat problems  R: NEGATIVE for significant cough or SOB  CV: NEGATIVE for chest pain, palpitations or peripheral edema    EXAM:                                                    /78 (BP Location: Right arm, Patient Position: Chair, Cuff Size: Adult Regular)  Pulse 61  Temp 98.2  F (36.8  C) (Oral)  Resp 12  Ht 5' 4\" (1.626 m)  Wt 142 lb (64.4 kg)  SpO2 98%  BMI 24.37 kg/m2  GENERAL APPEARANCE: healthy, alert and no distress  HENT: ear canals and TM's normal and nose and mouth without ulcers or lesions  RESP: lungs clear to auscultation - no rales, rhonchi or wheezes  CV: regular rate and rhythm, normal S1 S2, no S3 or S4 and no murmur, click or rub   ABDOMEN: soft, nontender, no HSM or masses and bowel sounds normal  NEURO: Normal strength and tone, sensory exam grossly normal, mentation intact and speech normal    DIAGNOSTICS:                                                    EKG: Not indicated due to non-vascular surgery and low risk of event (age <65 and without cardiac risk factors)    Recent Labs   Lab Test  04/10/17   0811  11/29/16   0926   HGB  13.7  13.4   PLT  224  247   NA  142  141   POTASSIUM  4.0  4.1   CR  0.72  0.73        IMPRESSION:                                                    Reason for surgery/procedure: umbilical hernia  Diagnosis/reason for consult: preoperarive clearance    The proposed surgical procedure is considered LOW risk.    REVISED CARDIAC RISK INDEX  The patient has the following serious cardiovascular risks for perioperative complications such as (MI, PE, VFib and 3  AV Block):  No serious cardiac " risks  INTERPRETATION: 0 risks: Class I (very low risk - 0.4% complication rate)    The patient has the following additional risks for perioperative complications:  No identified additional risks      ICD-10-CM    1. Preop general physical exam Z01.818        RECOMMENDATIONS:                                                      Will complete in case Keisha does decide to have surgery.    --Patient is to take all scheduled medications on the day of surgery EXCEPT for modifications listed below.    APPROVAL GIVEN to proceed with proposed procedure, without further diagnostic evaluation       Signed Electronically by: Eric Wilson MD    Copy of this evaluation report is provided to requesting physician.    [facility] Preop Guidelines

## 2017-05-10 ENCOUNTER — OFFICE VISIT (OUTPATIENT)
Dept: FAMILY MEDICINE | Facility: CLINIC | Age: 37
End: 2017-05-10
Payer: COMMERCIAL

## 2017-05-10 VITALS
HEART RATE: 61 BPM | HEIGHT: 64 IN | OXYGEN SATURATION: 98 % | SYSTOLIC BLOOD PRESSURE: 112 MMHG | WEIGHT: 142 LBS | DIASTOLIC BLOOD PRESSURE: 78 MMHG | TEMPERATURE: 98.2 F | BODY MASS INDEX: 24.24 KG/M2 | RESPIRATION RATE: 12 BRPM

## 2017-05-10 DIAGNOSIS — K42.9 UMBILICAL HERNIA WITHOUT OBSTRUCTION AND WITHOUT GANGRENE: ICD-10-CM

## 2017-05-10 DIAGNOSIS — Z01.818 PREOP GENERAL PHYSICAL EXAM: Primary | ICD-10-CM

## 2017-05-10 PROCEDURE — 99213 OFFICE O/P EST LOW 20 MIN: CPT | Performed by: FAMILY MEDICINE

## 2017-05-10 NOTE — MR AVS SNAPSHOT
After Visit Summary   5/10/2017    Keisha Espinoza    MRN: [id number]           Patient Information     Date Of Birth          1980        Visit Information        Provider Department      5/10/2017 3:40 PM Eric Wilson MD [facility]        Today's Diagnoses     Preop general physical exam    -  1    Umbilical hernia without obstruction and without gangrene          Care Instructions      Before Your Surgery      Call your surgeon if there is any change in your health. This includes signs of a cold or flu (such as a sore throat, runny nose, cough, rash or fever).    Do not smoke, drink alcohol or take over the counter medicine (unless your surgeon or primary care doctor tells you to) for the 24 hours before and after surgery.    If you take prescribed drugs: Follow your doctor s orders about which medicines to take and which to stop until after surgery.    Eating and drinking prior to surgery: follow the instructions from your surgeon    Take a shower or bath the night before surgery. Use the soap your surgeon gave you to gently clean your skin. If you do not have soap from your surgeon, use your regular soap. Do not shave or scrub the surgery site.  Wear clean pajamas and have clean sheets on your bed.         Follow-ups after your visit        Follow-up notes from your care team     Return in about 1 month (around 6/10/2017), or if symptoms worsen or fail to improve.      Your next 10 appointments already scheduled     May 25, 2017  7:30 AM CDT   [facility] Same Day Surgery with René Carrasquillo MD, Ingrid Robertson PA-C   [facility] ([facility])    [facility] ([facility])   [phone]            May 25, 2017   Procedure with René Carrasquillo MD   [facility] (--)    [address]  [facility] [id number] [phone]              Who to contact  "    If you have questions or need follow up information about today's clinic visit or your schedule please contact [facility] directly at [phone].  Normal or non-critical lab and imaging results will be communicated to you by MyChart, letter or phone within 4 business days after the clinic has received the results. If you do not hear from us within 7 days, please contact the clinic through [employer] or phone. If you have a critical or abnormal lab result, we will notify you by phone as soon as possible.  Submit refill requests through [employer] or call your pharmacy and they will forward the refill request to us. Please allow 3 business days for your refill to be completed.          Additional Information About Your Visit        [employer] Information     [employer] gives you secure access to your electronic health record. If you see a primary care provider, you can also send messages to your care team and make appointments. If you have questions, please call your primary care clinic.  If you do not have a primary care provider, please call [phone] and they will assist you.        Care EveryWhere ID     This is your Care EveryWhere ID. This could be used by other organizations to access your [place] medical records  [id number]        Your Vitals Were     Pulse Temperature Respirations Height Pulse Oximetry BMI (Body Mass Index)    61 98.2  F (36.8  C) (Oral) 12 5' 4\" (1.626 m) 98% 24.37 kg/m2       Blood Pressure from Last 3 Encounters:   05/10/17 112/78   05/01/17 98/60   04/26/17 106/68    Weight from Last 3 Encounters:   05/10/17 142 lb (64.4 kg)   05/01/17 141 lb 8 oz (64.2 kg)   04/26/17 141 lb (64 kg)              Today, you had the following     No orders found for display       Primary Care Provider Office Phone # Fax #    Eric Wilson [phone]       [facility] [address]  [facility] [address]        Thank you!     Thank you for choosing " [facility]  for your care. Our goal is always to provide you with excellent care. Hearing back from our patients is one way we can continue to improve our services. Please take a few minutes to complete the written survey that you may receive in the mail after your visit with us. Thank you!             Your Updated Medication List - Protect others around you: Learn how to safely use, store and throw away your medicines at www.disposemymeds.org.          This list is accurate as of: 5/10/17  4:20 PM.  Always use your most recent med list.                   Brand Name Dispense Instructions for use    breast pump Misc     1 each    1 each daily       estradiol 0.1 MG/GM cream    ESTRACE    42.5 g    Place 2 g vaginally three times a week Please 2 grams daily for 2 weeks; then three times per week       ketoconazole 2 % cream    NIZORAL    15 g    Apply topically 2 times daily       nystatin-triamcinolone ointment    MYCOLOG    30 g    Apply topically 2 times daily       Potassium Chloride ER 20 MEQ Tbcr     30 tablet    Take 1 tablet (20 mEq) by mouth daily       PRENATAL VITAMIN PO      Take 1 tablet by mouth.

## 2017-05-10 NOTE — NURSING NOTE
"Chief Complaint   Patient presents with     Pre-Op Exam       Initial /78 (BP Location: Right arm, Patient Position: Chair, Cuff Size: Adult Regular)  Pulse 61  Temp 98.2  F (36.8  C) (Oral)  Resp 12  Ht 5' 4\" (1.626 m)  Wt 142 lb (64.4 kg)  SpO2 98%  BMI 24.37 kg/m2 Estimated body mass index is 24.37 kg/(m^2) as calculated from the following:    Height as of this encounter: 5' 4\" (1.626 m).    Weight as of this encounter: 142 lb (64.4 kg).  Medication Reconciliation: complete Krystal Aviles CMA      "

## 2017-07-11 ENCOUNTER — ALLIED HEALTH/NURSE VISIT (OUTPATIENT)
Dept: NURSING | Facility: CLINIC | Age: 37
End: 2017-07-11
Payer: COMMERCIAL

## 2017-07-11 ENCOUNTER — OFFICE VISIT (OUTPATIENT)
Dept: FAMILY MEDICINE | Facility: CLINIC | Age: 37
End: 2017-07-11
Payer: COMMERCIAL

## 2017-07-11 VITALS — SYSTOLIC BLOOD PRESSURE: 115 MMHG | DIASTOLIC BLOOD PRESSURE: 80 MMHG

## 2017-07-11 VITALS
WEIGHT: 141.2 LBS | SYSTOLIC BLOOD PRESSURE: 108 MMHG | RESPIRATION RATE: 20 BRPM | BODY MASS INDEX: 24.24 KG/M2 | OXYGEN SATURATION: 98 % | TEMPERATURE: 98 F | HEART RATE: 80 BPM | DIASTOLIC BLOOD PRESSURE: 68 MMHG

## 2017-07-11 DIAGNOSIS — R42 LIGHTHEADEDNESS: Primary | ICD-10-CM

## 2017-07-11 DIAGNOSIS — Z01.30 BP CHECK: Primary | ICD-10-CM

## 2017-07-11 LAB
ERYTHROCYTE [DISTWIDTH] IN BLOOD BY AUTOMATED COUNT: 11.2 % (ref 10–15)
HBA1C MFR BLD: 4.9 % (ref 4.3–6)
HCT VFR BLD AUTO: 41.4 % (ref 35–47)
HGB BLD-MCNC: 13.8 G/DL (ref 11.7–15.7)
MCH RBC QN AUTO: 30.5 PG (ref 26.5–33)
MCHC RBC AUTO-ENTMCNC: 33.3 G/DL (ref 31.5–36.5)
MCV RBC AUTO: 92 FL (ref 78–100)
PLATELET # BLD AUTO: 220 10E9/L (ref 150–450)
RBC # BLD AUTO: 4.52 10E12/L (ref 3.8–5.2)
WBC # BLD AUTO: 5.8 10E9/L (ref 4–11)

## 2017-07-11 PROCEDURE — 99214 OFFICE O/P EST MOD 30 MIN: CPT | Performed by: PHYSICIAN ASSISTANT

## 2017-07-11 PROCEDURE — 83036 HEMOGLOBIN GLYCOSYLATED A1C: CPT | Performed by: PHYSICIAN ASSISTANT

## 2017-07-11 PROCEDURE — 80048 BASIC METABOLIC PNL TOTAL CA: CPT | Performed by: PHYSICIAN ASSISTANT

## 2017-07-11 PROCEDURE — 84443 ASSAY THYROID STIM HORMONE: CPT | Performed by: PHYSICIAN ASSISTANT

## 2017-07-11 PROCEDURE — 85027 COMPLETE CBC AUTOMATED: CPT | Performed by: PHYSICIAN ASSISTANT

## 2017-07-11 PROCEDURE — 99207 ZZC NO CHARGE NURSE ONLY: CPT

## 2017-07-11 PROCEDURE — 36415 COLL VENOUS BLD VENIPUNCTURE: CPT | Performed by: PHYSICIAN ASSISTANT

## 2017-07-11 RX ORDER — GLUCOSAMINE HCL/CHONDROITIN SU 500-400 MG
CAPSULE ORAL
Qty: 100 EACH | Refills: 3 | Status: SHIPPED | OUTPATIENT
Start: 2017-07-11 | End: 2017-12-21

## 2017-07-11 RX ORDER — LANCETS
EACH MISCELLANEOUS
Qty: 100 EACH | Refills: 6 | Status: SHIPPED | OUTPATIENT
Start: 2017-07-11 | End: 2017-12-21

## 2017-07-11 NOTE — NURSING NOTE
Pt was informed at office visit on 7/11/17 to come back and have her BP checked, at time of visit BP was 115/80.   Sasha Arreola MA

## 2017-07-11 NOTE — MR AVS SNAPSHOT
After Visit Summary   7/11/2017    Keisha Espinoza    MRN: [id number]           Patient Information     Date Of Birth          1980        Visit Information        Provider Department      7/11/2017 4:00 PM  NURSE [facility]        Today's Diagnoses     BP check    -  1       Follow-ups after your visit        Your next 10 appointments already scheduled     Jul 11, 2017  4:00 PM CDT   Nurse Only with  NURSE   [facility] ([facility])    [address] [facility] [address] [phone]              Who to contact     If you have questions or need follow up information about today's clinic visit or your schedule please contact [facility] directly at [phone].  Normal or non-critical lab and imaging results will be communicated to you by [employer], letter or phone within 4 business days after the clinic has received the results. If you do not hear from us within 7 days, please contact the clinic through MyChart or phone. If you have a critical or abnormal lab result, we will notify you by phone as soon as possible.  Submit refill requests through [employer] or call your pharmacy and they will forward the refill request to us. Please allow 3 business days for your refill to be completed.          Additional Information About Your Visit        MyChart Information     [employer] gives you secure access to your electronic health record. If you see a primary care provider, you can also send messages to your care team and make appointments. If you have questions, please call your primary care clinic.  If you do not have a primary care provider, please call [phone] and they will assist you.        Care EveryWhere ID     This is your Care EveryWhere ID. This could be used by other organizations to access your [place] medical records  [id number]         Blood Pressure from Last 3 Encounters:   07/11/17 115/80   07/11/17  108/68   05/10/17 112/78    Weight from Last 3 Encounters:   07/11/17 141 lb 3.2 oz (64 kg)   05/10/17 142 lb (64.4 kg)   05/01/17 141 lb 8 oz (64.2 kg)              Today, you had the following     No orders found for display         Today's Medication Changes          These changes are accurate as of: 7/11/17  3:46 PM.  If you have any questions, ask your nurse or doctor.               Start taking these medicines.        Dose/Directions    alcohol swab prep pads   Used for:  Lightheadedness   Started by:  Grecia Gonzalez PA-C        Use to swab area of injection/keith as directed.   Quantity:  100 each   Refills:  3       blood glucose calibration solution   Commonly known as:  NO BRAND SPECIFIED   Used for:  Lightheadedness   Started by:  Grecia Gonzalez PA-C        To accompany: Blood Glucose Monitor Brands: per insurance.   Quantity:  1 Bottle   Refills:  3       blood glucose monitoring meter device kit   Commonly known as:  no brand specified   Used for:  Lightheadedness   Started by:  Grecia Gonzalez PA-C        Use to test blood sugar 3 times daily or as directed. Preferred blood glucose meter OR supplies to accompany: Blood Glucose Monitor Brands: per insurance.   Quantity:  1 kit   Refills:  0       blood glucose monitoring test strip   Commonly known as:  no brand specified   Used for:  Lightheadedness   Started by:  Grecia Gonzalez PA-C        Use to test blood sugar 3 times daily or as directed. To accompany: Blood Glucose Monitor Brands: per insurance.   Quantity:  100 strip   Refills:  6       thin lancets   Commonly known as:  NO BRAND SPECIFIED   Used for:  Lightheadedness   Started by:  Grecia Gonzalez PA-C        Use with lanceting device. To accompany: Blood Glucose Monitor Brands: per insurance.   Quantity:  100 each   Refills:  6         Stop taking these medicines if you haven't already. Please contact your care team if you have questions.      estradiol 0.1 MG/GM cream   Commonly known as:  ESTRACE   Stopped by:  Greica Gonzalez PA-C           ketoconazole 2 % cream   Commonly known as:  NIZORAL   Stopped by:  Grecia Gonzalez PA-C           nystatin-triamcinolone ointment   Commonly known as:  MYCOLOG   Stopped by:  Grecia Gonzalez PA-C           Potassium Chloride ER 20 MEQ Tbcr   Stopped by:  Grecia Gonzalez PA-C                Where to get your medicines      These medications were [place] to [facility] [place] - [address]  [address] [place] [address]     Phone:  [phone]     alcohol swab prep pads    blood glucose calibration solution    blood glucose monitoring test strip    thin lancets         Some of these will need a paper prescription and others can be bought over the counter.  Ask your nurse if you have questions.     Bring a paper prescription for each of these medications     blood glucose monitoring meter device kit                Primary Care Provider Office Phone # Fax #    Eric Michael iWlson [phone]       [facility] [address]  [facility] [address]        Equal Access to Services     TANG CHEUNG AH: Hadii aad ku hadasho Soomaali, waaxda luqadaha, qaybta kaalmada adeegyada, waxay litin hayjosen rudi ames. So [facility] [phone].    ATENCIÓN: Si habla español, tiene a castillo disposición servicios gratuitos de asistencia lingüística. Llame al [phone].    We comply with applicable federal civil rights laws and [place] laws. We do not discriminate on the basis of race, color, national origin, age, disability sex, sexual orientation or gender identity.            Thank you!     Thank you for choosing [facility]  for your care. Our goal is always to provide you with excellent care. Hearing back from our patients is one way we can continue to improve our services. Please take a few minutes  to complete the written survey that you may receive in the mail after your visit with us. Thank you!             Your Updated Medication List - Protect others around you: Learn how to safely use, store and throw away your medicines at www.disposemymeds.org.          This list is accurate as of: 7/11/17  3:46 PM.  Always use your most recent med list.                   Brand Name Dispense Instructions for use Diagnosis    alcohol swab prep pads     100 each    Use to swab area of injection/keith as directed.    [employer]       blood glucose calibration solution    NO BRAND SPECIFIED    1 Bottle    To accompany: Blood Glucose Monitor Brands: per insurance.    [employer]       blood glucose monitoring meter device kit    no brand specified    1 kit    Use to test blood sugar 3 times daily or as directed. Preferred blood glucose meter OR supplies to accompany: Blood Glucose Monitor Brands: per insurance.    [employer]       blood glucose monitoring test strip    no brand specified    100 strip    Use to test blood sugar 3 times daily or as directed. To accompany: Blood Glucose Monitor Brands: per insurance.    [employer]       breast pump Misc     1 each    1 each daily    High-risk pregnancy, elderly multigravida, second trimester       PRENATAL VITAMIN PO      Take 1 tablet by mouth.        thin lancets    NO BRAND SPECIFIED    100 each    Use with lanceting device. To accompany: Blood Glucose Monitor Brands: per insurance.    [employer]

## 2017-07-11 NOTE — MR AVS SNAPSHOT
After Visit Summary   7/11/2017    Keisha Espinoza    MRN: [id number]           Patient Information     Date Of Birth          1980        Visit Information        Provider Department      7/11/2017 7:50 AM Grecia Gonzalez PA-C [facility]        Today's Diagnoses     Lightheadedness    -  1       Follow-ups after your visit        Who to contact     If you have questions or need follow up information about today's clinic visit or your schedule please contact [facility] directly at [phone].  Normal or non-critical lab and imaging results will be communicated to you by Omnidronehart, letter or phone within 4 business days after the clinic has received the results. If you do not hear from us within 7 days, please contact the clinic through [email or web address] or phone. If you have a critical or abnormal lab result, we will notify you by phone as soon as possible.  Submit refill requests through [address] or call your pharmacy and they will forward the refill request to us. Please allow 3 business days for your refill to be completed.          Additional Information About Your Visit        MyChart Information     [address] gives you secure access to your electronic health record. If you see a primary care provider, you can also send messages to your care team and make appointments. If you have questions, please call your primary care clinic.  If you do not have a primary care provider, please call [phone] and they will assist you.        Care EveryWhere ID     This is your Care EveryWhere ID. This could be used by other organizations to access your [place] medical records  [id number]        Your Vitals Were     Pulse Temperature Respirations Pulse Oximetry Breastfeeding? BMI (Body Mass Index)    80 98  F (36.7  C) (Oral) 20 98% Yes 24.24 kg/m2       Blood Pressure from Last 3 Encounters:   07/11/17 108/68   05/10/17 112/78   05/01/17 98/60    Weight from Last 3  Encounters:   07/11/17 141 lb 3.2 oz (64 kg)   05/10/17 142 lb (64.4 kg)   05/01/17 141 lb 8 oz (64.2 kg)              We Performed the Following     Basic metabolic panel     CBC with platelets     Hemoglobin A1c     TSH with free T4 reflex          Today's Medication Changes          These changes are accurate as of: 7/11/17  8:18 AM.  If you have any questions, ask your nurse or doctor.               Start taking these medicines.        Dose/Directions    alcohol swab prep pads   Used for:  Lightheadedness   Started by:  Grecia Gonzalez PA-C        Use to swab area of injection/keith as directed.   Quantity:  100 each   Refills:  3       blood glucose calibration solution   Commonly known as:  NO BRAND SPECIFIED   Used for:  Lightheadedness   Started by:  Grecia Gonzalez PA-C        To accompany: Blood Glucose Monitor Brands: per insurance.   Quantity:  1 Bottle   Refills:  3       blood glucose monitoring meter device kit   Commonly known as:  no brand specified   Used for:  Lightheadedness   Started by:  Grecia Gonzalez PA-C        Use to test blood sugar 3 times daily or as directed. Preferred blood glucose meter OR supplies to accompany: Blood Glucose Monitor Brands: per insurance.   Quantity:  1 kit   Refills:  0       blood glucose monitoring test strip   Commonly known as:  no brand specified   Used for:  Lightheadedness   Started by:  Grecia Gonzalez PA-C        Use to test blood sugar 3 times daily or as directed. To accompany: Blood Glucose Monitor Brands: per insurance.   Quantity:  100 strip   Refills:  6       thin lancets   Commonly known as:  NO BRAND SPECIFIED   Used for:  Lightheadedness   Started by:  Grecia Gonzalez PA-C        Use with lanceting device. To accompany: Blood Glucose Monitor Brands: per insurance.   Quantity:  100 each   Refills:  6         Stop taking these medicines if you haven't already. Please contact your care team if you  have questions.     estradiol 0.1 MG/GM cream   Commonly known as:  ESTRACE   Stopped by:  Grecia Gonzlaez PA-C           ketoconazole 2 % cream   Commonly known as:  NIZORAL   Stopped by:  Grecia Gonzalez PA-C           nystatin-triamcinolone ointment   Commonly known as:  MYCOLOG   Stopped by:  Grecia Gonzalez PA-C           Potassium Chloride ER 20 MEQ Tbcr   Stopped by:  Grecia Gonzalez PA-C                Where to get your medicines      These [place] [facility] [place] [address]  [address] [place] [address]     Phone:  [phone]     alcohol swab prep pads    blood glucose calibration solution    blood glucose monitoring test strip    thin lancets         Some of these will need a paper prescription and others can be bought over the counter.  Ask your nurse if you have questions.     Bring a paper prescription for each of these medications     blood glucose monitoring meter device kit                Primary Care Provider Office Phone # Fax #    Eric Michael Wilson [phone]       [facility] [address]  [facility] [address]        Equal Access to Services     TANG CHEUNG AH: Hadii aad ku hadasho Soomaali, waaxda luqadaha, qaybta kaalmada adeegyada, waxay leroy eldern rudi ames. So [facility] [phone].    ATENCIÓN: Si habla español, tiene a castillo disposición servicios gratuitos de asistencia lingüística. Llame al [phone].    We comply with applicable federal civil rights laws and [place] laws. We do not discriminate on the basis of race, color, national origin, age, disability sex, sexual orientation or gender identity.            Thank you!     Thank you for choosing [facility]  for your care. Our goal is always to provide you with excellent care. Hearing back from our patients is one way we can continue to improve our services. Please  take a few minutes to complete the written survey that you may receive in the mail after your visit with us. Thank you!             Your Updated Medication List - Protect others around you: Learn how to safely use, store and throw away your medicines at www.disposemymeds.org.          This list is accurate as of: 7/11/17  8:18 AM.  Always use your most recent med list.                   Brand Name Dispense Instructions for use Diagnosis    alcohol swab prep pads     100 each    Use to swab area of injection/keith as directed.    Rockwell CollinsCatbird       blood glucose calibration solution    NO BRAND SPECIFIED    1 Bottle    To accompany: Blood Glucose Monitor Brands: per insurance.    LightIndiaHomes       blood glucose monitoring meter device kit    no brand specified    1 kit    Use to test blood sugar 3 times daily or as directed. Preferred blood glucose meter OR supplies to accompany: Blood Glucose Monitor Brands: per insurance.    LightIndiaHomes       blood glucose monitoring test strip    no brand specified    100 strip    Use to test blood sugar 3 times daily or as directed. To accompany: Blood Glucose Monitor Brands: per insurance.    LightheadAilola       breast pump Misc     1 each    1 each daily    High-risk pregnancy, elderly multigravida, second trimester       PRENATAL VITAMIN PO      Take 1 tablet by mouth.        thin lancets    NO BRAND SPECIFIED    100 each    Use with lanceting device. To accompany: Blood Glucose Monitor Brands: per insurance.    CapsoVision

## 2017-07-11 NOTE — NURSING NOTE
"Chief Complaint   Patient presents with     Dizziness     not feeling well for past few days; feeling jittery and lightheaded       Initial BP (!) 84/54 (BP Location: Right arm, Patient Position: Chair, Cuff Size: Adult Regular)  Pulse 80  Temp 98  F (36.7  C) (Oral)  Resp 20  Wt 141 lb 3.2 oz (64 kg)  SpO2 98%  Breastfeeding? Yes  BMI 24.24 kg/m2 Estimated body mass index is 24.24 kg/(m^2) as calculated from the following:    Height as of 5/10/17: 5' 4\" (1.626 m).    Weight as of this encounter: 141 lb 3.2 oz (64 kg).  Medication Reconciliation: incomplete   Viki Razo CMA (AAMA)      "

## 2017-07-11 NOTE — PROGRESS NOTES
SUBJECTIVE:                                                    Keisha Espinoza is a 37 year old female who presents to clinic today for the following health issues:    Dizziness      Duration: x2 days    Description   Feeling faint:  no   Feeling like the surroundings are moving: no   Loss of consciousness or falls: no     Intensity:  moderate    Accompanying signs and symptoms:   Nausea/vomitting: YES- stomach feels uneasy  Palpitations: YES- in the past, due to low potasium  Weakness in arms or legs: no   Vision or speech changes: no   Ringing in ears (Tinnitus): no   Hearing loss related to dizziness: no   Other (fevers/chills/sweating/dyspnea): tingling in her extremities and scalp    History (similar episodes/head trauma/previous evaluation/recent bleeding): None    Precipitating or alleviating factors (new meds/chemicals): None  Worse with activity/head movement: no     Therapies tried and outcome: None      Patient is here today complaining of not feeling well  Somewhat lightheaded, no dizziness  She admits for the last two days feeling not like herself, somewhat nauseated  No fever, headache, swollen glands, cough/cold symptoms  No chest pain, racing heart, syncope, or palpitations  No room spinning  No vomiting or diarrhea  Admits to no caffeine intake  Is not sleeping well- still breastfeeding up 3x/night for 9 months  Also notes that she is eating and drinking well  Mom is type 2 diabetic, she was borderline during pregnancy    Problem list and histories reviewed & adjusted, as indicated.  Additional history: as documented    Patient Active Problem List   Diagnosis     CARDIOVASCULAR SCREENING; LDL GOAL LESS THAN 160     Short MI-normal QRS complex syndrome     History of tobacco abuse     Umbilical hernia without obstruction and without gangrene     Past Surgical History:   Procedure Laterality Date     EXTRACTION(S) DENTAL         Social History   Substance Use Topics     Smoking status: Former  Smoker     Years: 10.00     Smokeless tobacco: Never Used      Comment: quit 2004     Alcohol use No      Comment: social     Family History   Problem Relation Age of Onset     DIABETES Mother      Hypertension Mother      Other Cancer Mother      Hypertension Father      Heart Defect Father      Afib     Prostate Cancer Father      Other Cancer Father      Cancer - colorectal Paternal Grandmother      Breast Cancer No family hx of          Current Outpatient Prescriptions   Medication Sig Dispense Refill     blood glucose monitoring (NO BRAND SPECIFIED) meter device kit Use to test blood sugar 3 times daily or as directed. Preferred blood glucose meter OR supplies to accompany: Blood Glucose Monitor Brands: per insurance. 1 kit 0     blood glucose monitoring (NO BRAND SPECIFIED) test strip Use to test blood sugar 3 times daily or as directed. To accompany: Blood Glucose Monitor Brands: per insurance. 100 strip 6     blood glucose calibration (NO BRAND SPECIFIED) solution To accompany: Blood Glucose Monitor Brands: per insurance. 1 Bottle 3     thin (NO BRAND SPECIFIED) lancets Use with lanceting device. To accompany: Blood Glucose Monitor Brands: per insurance. 100 each 6     alcohol swab prep pads Use to swab area of injection/keith as directed. 100 each 3     Misc. Devices (BREAST PUMP) MISC 1 each daily 1 each 1     Prenatal Vit-Fe Sulfate-FA (PRENATAL VITAMIN OR) Take 1 tablet by mouth.       Allergies   Allergen Reactions     Sulfa Drugs Rash       Reviewed and updated as needed this visit by clinical staff  Tobacco  Allergies  Med Hx  Surg Hx  Fam Hx  Soc Hx      Reviewed and updated as needed this visit by Provider         ROS:  Constitutional, HEENT, cardiovascular, pulmonary, gi and gu systems are negative, except as otherwise noted.    OBJECTIVE:     /68 (BP Location: Right arm, Patient Position: Chair, Cuff Size: Adult Regular)  Pulse 80  Temp 98  F (36.7  C) (Oral)  Resp 20  Wt 141 lb 3.2  oz (64 kg)  SpO2 98%  Breastfeeding? Yes  BMI 24.24 kg/m2  Body mass index is 24.24 kg/(m^2).  GENERAL: healthy, alert and no distress  EYES: Eyes grossly normal to inspection, PERRL and conjunctivae and sclerae normal  HENT: ear canals and TM's normal, nose and mouth without ulcers or lesions  NECK: no adenopathy, no asymmetry, masses, or scars and thyroid normal to palpation  RESP: lungs clear to auscultation - no rales, rhonchi or wheezes  CV: regular rate and rhythm, normal S1 S2, no S3 or S4, no murmur, click or rub, no peripheral edema and peripheral pulses strong  ABDOMEN: soft, nontender, no hepatosplenomegaly, no masses and bowel sounds normal  MS: no gross musculoskeletal defects noted, no edema  SKIN: no suspicious lesions or rashes  NEURO: Normal strength and tone, mentation intact and speech normal    Labs pending    ASSESSMENT/PLAN:             1. Lightheadedness  New problem, unclear cause.  Will check basic labs today.  Discussed will also have her check and monitor glucose to ensure no hyper/hypoglycemia.  Advised to check BP as this may also be contributing factor as it was low on first check today at 84/54, recommend pushing fluids and trial increased salt to see if symptoms improve.  Lastly, discussed may also be related to lack of full night of sleep.  Advised f/u if symptoms worsen or do not improve.  Will send in glucose readings after 1 week.  - CBC with platelets  - TSH with free T4 reflex  - Basic metabolic panel  - Hemoglobin A1c  - blood glucose monitoring (NO BRAND SPECIFIED) meter device kit; Use to test blood sugar 3 times daily or as directed. Preferred blood glucose meter OR supplies to accompany: Blood Glucose Monitor Brands: per insurance.  Dispense: 1 kit; Refill: 0  - blood glucose monitoring (NO BRAND SPECIFIED) test strip; Use to test blood sugar 3 times daily or as directed. To accompany: Blood Glucose Monitor Brands: per insurance.  Dispense: 100 strip; Refill: 6  - blood  glucose calibration (NO BRAND SPECIFIED) solution; To accompany: Blood Glucose Monitor Brands: per insurance.  Dispense: 1 Bottle; Refill: 3  - thin (NO BRAND SPECIFIED) lancets; Use with lanceting device. To accompany: Blood Glucose Monitor Brands: per insurance.  Dispense: 100 each; Refill: 6  - alcohol swab prep pads; Use to swab area of injection/keith as directed.  Dispense: 100 each; Refill: 3    Risks, benefits and alternatives were discussed with patient. Agreeable to the plan of care.      Grecia Gonzalez PA-C  [facility]

## 2017-07-12 LAB
ANION GAP SERPL CALCULATED.3IONS-SCNC: 6 MMOL/L (ref 3–14)
BUN SERPL-MCNC: 16 MG/DL (ref 7–30)
CALCIUM SERPL-MCNC: 9.8 MG/DL (ref 8.5–10.1)
CHLORIDE SERPL-SCNC: 107 MMOL/L (ref 94–109)
CO2 SERPL-SCNC: 28 MMOL/L (ref 20–32)
CREAT SERPL-MCNC: 0.72 MG/DL (ref 0.52–1.04)
GFR SERPL CREATININE-BSD FRML MDRD: ABNORMAL ML/MIN/1.7M2
GLUCOSE SERPL-MCNC: 64 MG/DL (ref 70–99)
POTASSIUM SERPL-SCNC: 3.9 MMOL/L (ref 3.4–5.3)
SODIUM SERPL-SCNC: 141 MMOL/L (ref 133–144)
TSH SERPL DL<=0.005 MIU/L-ACNC: 2.74 MU/L (ref 0.4–4)

## 2017-07-25 ENCOUNTER — OFFICE VISIT (OUTPATIENT)
Dept: FAMILY MEDICINE | Facility: CLINIC | Age: 37
End: 2017-07-25
Payer: COMMERCIAL

## 2017-07-25 VITALS
HEART RATE: 65 BPM | SYSTOLIC BLOOD PRESSURE: 110 MMHG | OXYGEN SATURATION: 99 % | RESPIRATION RATE: 20 BRPM | DIASTOLIC BLOOD PRESSURE: 70 MMHG | TEMPERATURE: 98 F | WEIGHT: 141.5 LBS | BODY MASS INDEX: 24.29 KG/M2

## 2017-07-25 DIAGNOSIS — Z13.6 CARDIOVASCULAR SCREENING; LDL GOAL LESS THAN 160: ICD-10-CM

## 2017-07-25 DIAGNOSIS — F41.1 GAD (GENERALIZED ANXIETY DISORDER): Primary | ICD-10-CM

## 2017-07-25 LAB
CHOLEST SERPL-MCNC: 207 MG/DL
HDLC SERPL-MCNC: 65 MG/DL
LDLC SERPL CALC-MCNC: 127 MG/DL
NONHDLC SERPL-MCNC: 142 MG/DL
TRIGL SERPL-MCNC: 74 MG/DL

## 2017-07-25 PROCEDURE — 99214 OFFICE O/P EST MOD 30 MIN: CPT | Performed by: FAMILY MEDICINE

## 2017-07-25 PROCEDURE — 80061 LIPID PANEL: CPT | Performed by: FAMILY MEDICINE

## 2017-07-25 PROCEDURE — 36415 COLL VENOUS BLD VENIPUNCTURE: CPT | Performed by: FAMILY MEDICINE

## 2017-07-25 ASSESSMENT — ENCOUNTER SYMPTOMS
CONSTITUTIONAL NEGATIVE: 1
PALPITATIONS: 0
NERVOUS/ANXIOUS: 1
DIARRHEA: 0
GASTROINTESTINAL NEGATIVE: 1
INSOMNIA: 0
SHORTNESS OF BREATH: 1
CONSTIPATION: 0
DEPRESSION: 0

## 2017-07-25 ASSESSMENT — ANXIETY QUESTIONNAIRES
GAD7 TOTAL SCORE: 8
1. FEELING NERVOUS, ANXIOUS, OR ON EDGE: SEVERAL DAYS
2. NOT BEING ABLE TO STOP OR CONTROL WORRYING: SEVERAL DAYS
IF YOU CHECKED OFF ANY PROBLEMS ON THIS QUESTIONNAIRE, HOW DIFFICULT HAVE THESE PROBLEMS MADE IT FOR YOU TO DO YOUR WORK, TAKE CARE OF THINGS AT HOME, OR GET ALONG WITH OTHER PEOPLE: SOMEWHAT DIFFICULT
7. FEELING AFRAID AS IF SOMETHING AWFUL MIGHT HAPPEN: MORE THAN HALF THE DAYS
6. BECOMING EASILY ANNOYED OR IRRITABLE: MORE THAN HALF THE DAYS
3. WORRYING TOO MUCH ABOUT DIFFERENT THINGS: SEVERAL DAYS
5. BEING SO RESTLESS THAT IT IS HARD TO SIT STILL: NOT AT ALL

## 2017-07-25 ASSESSMENT — PATIENT HEALTH QUESTIONNAIRE - PHQ9: 5. POOR APPETITE OR OVEREATING: SEVERAL DAYS

## 2017-07-25 NOTE — NURSING NOTE
"Chief Complaint   Patient presents with     Lipids     cholesterol check       Initial /70  Pulse 65  Temp 98  F (36.7  C) (Oral)  Resp 20  Wt 141 lb 8 oz (64.2 kg)  SpO2 99%  Breastfeeding? Yes  BMI 24.29 kg/m2 Estimated body mass index is 24.29 kg/(m^2) as calculated from the following:    Height as of 5/10/17: 5' 4\" (1.626 m).    Weight as of this encounter: 141 lb 8 oz (64.2 kg).  Medication Reconciliation: complete   Viki Razo CMA (AAMA)      "

## 2017-07-25 NOTE — MR AVS SNAPSHOT
After Visit Summary   7/25/2017    Keisha Espinoza    MRN: [id number]           Patient Information     Date Of Birth          1980        Visit Information        Provider Department      7/25/2017 8:00 AM Eric Wilson MD [facility]        Today's Diagnoses     REN (generalized anxiety disorder)    -  1    CARDIOVASCULAR SCREENING; LDL GOAL LESS THAN 160           Follow-ups after your visit        Follow-up notes from your care team     Return in about 1 month (around 8/25/2017).      Future tests that were ordered for you today     Open Future Orders        Priority Expected Expires Ordered    Lipid panel reflex to direct LDL Routine  7/25/2018 7/25/2017            Who to contact     If you have questions or need follow up information about today's clinic visit or your schedule please contact [facility] directly at [phone].  Normal or non-critical lab and imaging results will be communicated to you by Mindiehart, letter or phone within 4 business days after the clinic has received the results. If you do not hear from us within 7 days, please contact the clinic through Mindiehart or phone. If you have a critical or abnormal lab result, we will notify you by phone as soon as possible.  Submit refill requests through Gaosouyi or call your pharmacy and they will forward the refill request to us. Please allow 3 business days for your refill to be completed.          Additional Information About Your Visit        MyChart Information     Ga[employer] gives you secure access to your electronic health record. If you see a primary care provider, you can also send messages to your care team and make appointments. If you have questions, please call your primary care clinic.  If you do not have a primary care provider, please call [phone] and they will assist you.        Care EveryWhere ID     This is your Care EveryWhere ID. This could be used by other  organizations to access your [place] medical records  [id number]        Your Vitals Were     Pulse Temperature Respirations Pulse Oximetry Breastfeeding? BMI (Body Mass Index)    65 98  F (36.7  C) (Oral) 20 99% Yes 24.29 kg/m2       Blood Pressure from Last 3 Encounters:   07/25/17 110/70   07/11/17 115/80   07/11/17 108/68    Weight from Last 3 Encounters:   07/25/17 141 lb 8 oz (64.2 kg)   07/11/17 141 lb 3.2 oz (64 kg)   05/10/17 142 lb (64.4 kg)               Primary Care Provider Office Phone # Fax #    Eric Michael Wilson [phone]       [facility] [address]  [facility] [address]        Equal Access to Services     TANG CHEUNG : Hadii teddy ku hadasho Soomaali, waaxda luqadaha, qaybta kaalmada adeegyada, waxay litin hayector ames. So [facility] [phone].    ATENCIÓN: Si habla español, tiene a castillo disposición servicios gratuitos de asistencia lingüística. Theo al [phone].    We comply with applicable federal civil rights laws and [place] laws. We do not discriminate on the basis of race, color, national origin, age, disability sex, sexual orientation or gender identity.            Thank you!     Thank you for choosing [facility]  for your care. Our goal is always to provide you with excellent care. Hearing back from our patients is one way we can continue to improve our services. Please take a few minutes to complete the written survey that you may receive in the mail after your visit with us. Thank you!             Your Updated Medication List - Protect others around you: Learn how to safely use, store and throw away your medicines at www.disposemymeds.org.          This list is accurate as of: 7/25/17  8:31 AM.  Always use your most recent med list.                   Brand Name Dispense Instructions for use Diagnosis    alcohol swab prep pads     100 each    Use to swab area of injection/keith as directed.    Lightheadedness        blood glucose calibration solution    NO BRAND SPECIFIED    1 Bottle    To accompany: Blood Glucose Monitor Brands: per insurance.    [facility]       blood glucose monitoring meter device kit    no brand specified    1 kit    Use to test blood sugar 3 times daily or as directed. Preferred blood glucose meter OR supplies to accompany: Blood Glucose Monitor Brands: per insurance.    [facility]       blood glucose monitoring test strip    no brand specified    100 strip    Use to test blood sugar 3 times daily or as directed. To accompany: Blood Glucose Monitor Brands: per insurance.    LightheadedFamilySkyline       breast pump Misc     1 each    1 each daily    High-risk pregnancy, elderly multigravida, second trimester       PRENATAL VITAMIN PO      Take 1 tablet by mouth.        thin lancets    NO BRAND SPECIFIED    100 each    Use with lanceting device. To accompany: Blood Glucose Monitor Brands: per insurance.    [facility]

## 2017-07-25 NOTE — PROGRESS NOTES
SUBJECTIVE:                                                    Keisha Espinoza is a 37 year old female who presents to clinic today for the following health issues:      Hyperlipidemia Follow-Up    Still feeling off - wondering abut stress.  New job with significant new responsibility, getting a handle on things, things should be calming down but still feeling stressed.  More irritable.   Noting emotional lability (tearful).  Still getting up at nights to nurse, twice.  No significant issues with mood in the past, although did have a few panic attacks after first pregnancy.  Chest and neck feel tight, occasional tingling in fingers.  Younger sister also has anxiety.    Confirms worry, although typically focal (work, kids, health), denies rumination.

## 2017-07-25 NOTE — PROGRESS NOTES
HPI  Keisha Espinoza is a 37 year old female who presents to clinic today for the following health issues:      Still feeling off - wondering abut stress.  New job with significant new responsibility, getting a handle on things, things should be calming down but still feeling stressed.  More irritable.   Noting emotional lability (tearful).  Still getting up at nights to nurse, twice.  No significant issues with mood in the past, although did have a few panic attacks after first pregnancy.  Chest and neck feel tight, occasional tingling in fingers.  Younger sister also has anxiety.    Confirms worry, although typically focal (work, kids, health), denies rumination.  Did do some counseling in college.  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  Finding exercise difficult with work/family obligations.    Review of Systems   Constitutional: Negative.    Respiratory: Positive for shortness of breath.    Cardiovascular: Positive for chest pain. Negative for palpitations.   Gastrointestinal: Negative.  Negative for constipation and diarrhea.   Psychiatric/Behavioral: Negative for depression and suicidal ideas. The patient is nervous/anxious. The patient does not have insomnia.          Physical Exam   Constitutional: She is oriented to person, place, and time and well-developed, well-nourished, and in no distress.   Eyes: Conjunctivae and EOM are normal.   Cardiovascular: Normal rate, regular rhythm and normal heart sounds.    Pulmonary/Chest: Effort normal and breath sounds normal.   Musculoskeletal: She exhibits no edema.   Neurological: She is alert and oriented to person, place, and time.   Skin: Skin is warm and dry.   Psychiatric: Affect normal. Her mood appears anxious.   PHQ9:3  GAD7:8     Vitals reviewed.    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

## 2017-07-26 ASSESSMENT — ANXIETY QUESTIONNAIRES: GAD7 TOTAL SCORE: 8

## 2017-07-26 ASSESSMENT — PATIENT HEALTH QUESTIONNAIRE - PHQ9: SUM OF ALL RESPONSES TO PHQ QUESTIONS 1-9: 3

## 2017-10-24 PROBLEM — F41.1 GAD (GENERALIZED ANXIETY DISORDER): Status: ACTIVE | Noted: 2017-10-24

## 2017-12-04 ENCOUNTER — OFFICE VISIT (OUTPATIENT)
Dept: FAMILY MEDICINE | Facility: CLINIC | Age: 37
End: 2017-12-04
Payer: COMMERCIAL

## 2017-12-04 VITALS
WEIGHT: 143.5 LBS | RESPIRATION RATE: 16 BRPM | OXYGEN SATURATION: 98 % | TEMPERATURE: 97.5 F | BODY MASS INDEX: 24.5 KG/M2 | DIASTOLIC BLOOD PRESSURE: 66 MMHG | HEIGHT: 64 IN | HEART RATE: 76 BPM | SYSTOLIC BLOOD PRESSURE: 100 MMHG

## 2017-12-04 DIAGNOSIS — M79.671 BILATERAL FOOT PAIN: Primary | ICD-10-CM

## 2017-12-04 DIAGNOSIS — M79.672 BILATERAL FOOT PAIN: Primary | ICD-10-CM

## 2017-12-04 PROCEDURE — 99213 OFFICE O/P EST LOW 20 MIN: CPT | Performed by: NURSE PRACTITIONER

## 2017-12-04 NOTE — PROGRESS NOTES
SUBJECTIVE:   Keisha Espinoza is a 37 year old female who presents to clinic today for the following health issues:      Joint Pain    Onset: months     Description:   Location: left foot and right foot   Character: Sharp and Stabbing    Intensity: moderate    Progression of Symptoms: worse    Accompanying Signs & Symptoms:  Other symptoms: swelling    History:   Previous similar pain: no       Precipitating factors:   Trauma or overuse: YES- on feet all day     Alleviating factors:  Improved by: nothing    Therapies Tried and outcome: ice--last night first time      Has been trying to wear shoes with more cushion.  Works in a ; on feet all day long.  Pain seems to be on the sides of the feet.          Problem list and histories reviewed & adjusted, as indicated.  Additional history: as documented    Current Outpatient Prescriptions   Medication Sig Dispense Refill     blood glucose monitoring (NO BRAND SPECIFIED) meter device kit Use to test blood sugar 3 times daily or as directed. Preferred blood glucose meter OR supplies to accompany: Blood Glucose Monitor Brands: per insurance. 1 kit 0     blood glucose monitoring (NO BRAND SPECIFIED) test strip Use to test blood sugar 3 times daily or as directed. To accompany: Blood Glucose Monitor Brands: per insurance. 100 strip 6     blood glucose calibration (NO BRAND SPECIFIED) solution To accompany: Blood Glucose Monitor Brands: per insurance. 1 Bottle 3     thin (NO BRAND SPECIFIED) lancets Use with lanceting device. To accompany: Blood Glucose Monitor Brands: per insurance. 100 each 6     alcohol swab prep pads Use to swab area of injection/keith as directed. 100 each 3     Misc. Devices (BREAST PUMP) MISC 1 each daily 1 each 1     Prenatal Vit-Fe Sulfate-FA (PRENATAL VITAMIN OR) Take 1 tablet by mouth.       Allergies   Allergen Reactions     Sulfa Drugs Rash       Reviewed and updated as needed this visit by clinical staffTobacco  Allergies  Meds   "Med Hx  Surg Hx  Fam Hx  Soc Hx      Reviewed and updated as needed this visit by Provider         ROS:  INTEGUMENTARY/SKIN: NEGATIVE for worrisome rashes, moles or lesions  MUSCULOSKELETAL: POSITIVE  for pain in feet  NEURO: NEGATIVE for numbness and tingling    OBJECTIVE:     /66 (BP Location: Right arm, Patient Position: Chair, Cuff Size: Adult Regular)  Pulse 76  Temp 97.5  F (36.4  C) (Oral)  Resp 16  Ht 5' 4\" (1.626 m)  Wt 143 lb 8 oz (65.1 kg)  SpO2 98%  Breastfeeding? Yes  BMI 24.63 kg/m2  Body mass index is 24.63 kg/(m^2).  GENERAL: healthy, alert and no distress  MS: bilat feet with normal strength, no obvious deformities, swelling or redness; mild tenderness to the lateral dorsal aspect of the foot  SKIN: warm and dry; no suspicious lesions or rashes       Diagnostic Test Results:  none     ASSESSMENT/PLAN:   1. Bilateral foot pain  Rest, ice, supportive shoes and ibuprofen as needed.        F/u 4 wks; sooner if worsening     TRISTIAN Gaitan [facility]    "

## 2017-12-04 NOTE — NURSING NOTE
"Chief Complaint   Patient presents with     Musculoskeletal Problem       Initial /66 (BP Location: Right arm, Patient Position: Chair, Cuff Size: Adult Regular)  Pulse 76  Temp 97.5  F (36.4  C) (Oral)  Resp 16  Ht 5' 4\" (1.626 m)  Wt 143 lb 8 oz (65.1 kg)  SpO2 98%  Breastfeeding? Yes  BMI 24.63 kg/m2 Estimated body mass index is 24.63 kg/(m^2) as calculated from the following:    Height as of this encounter: 5' 4\" (1.626 m).    Weight as of this encounter: 143 lb 8 oz (65.1 kg).  Medication Reconciliation: complete      Health Maintenance addressed:  NONE    N/a    CASSIE Long        "

## 2017-12-04 NOTE — MR AVS SNAPSHOT
After Visit Summary   12/4/2017    Keisha Espinoza    MRN: [id number]           Patient Information     Date Of Birth          1980        Visit Information        Provider Department      12/4/2017 9:20 AM Nafisa Richards APRN CNP [facility]        Today's Diagnoses     Bilateral foot pain    -  1      Care Instructions    Rest, ice, supportive shoes, and ibuprofen as needed.            Follow-ups after your visit        Follow-up notes from your care team     Return in about 4 weeks (around 1/1/2018), or if symptoms worsen or fail to improve, for foot pain .      Who to contact     If you have questions or need follow up information about today's clinic visit or your schedule please contact [facility] directly at [phone].  Normal or non-critical lab and imaging results will be communicated to you by MyChart, letter or phone within 4 business days after the clinic has received the results. If you do not hear from us within 7 days, please contact the clinic through [facility] or phone. If you have a critical or abnormal lab result, we will notify you by phone as soon as possible.  Submit refill requests through [employer] or call your pharmacy and they will forward the refill request to us. Please allow 3 business days for your refill to be completed.          Additional Information About Your Visit        MyChart Information     [employer] gives you secure access to your electronic health record. If you see a primary care provider, you can also send messages to your care team and make appointments. If you have questions, please call your primary care clinic.  If you do not have a primary care provider, please call [phone] and they will assist you.        Care EveryWhere ID     This is your Care EveryWhere ID. This could be used by other organizations to access your [place] medical records  [id number]        Your Vitals Were     Pulse Temperature  "Respirations Height Pulse Oximetry Breastfeeding?    76 97.5  F (36.4  C) (Oral) 16 5' 4\" (1.626 m) 98% Yes    BMI (Body Mass Index)                   24.63 kg/m2            Blood Pressure from Last 3 Encounters:   12/04/17 100/66   07/25/17 110/70   07/11/17 115/80    Weight from Last 3 Encounters:   12/04/17 143 lb 8 oz (65.1 kg)   07/25/17 141 lb 8 oz (64.2 kg)   07/11/17 141 lb 3.2 oz (64 kg)              Today, you had the following     No orders found for display       Primary Care Provider Office Phone # Fax #    Eric Michael Wilson [phone]       [id number]  [address] [facility] [address]        Equal Access to Services     TANG CHEUNG : Hadii teddy spear hadasho Soomaali, waaxda luqadaha, qaybta kaalmada adeegyada, victoria borges . So [facility] [phone].    ATENCIÓN: Si habla español, tiene a castillo disposición servicios gratuitos de asistencia lingüística. Theo al [phone].    We comply with applicable federal civil rights laws and [place] laws. We do not discriminate on the basis of race, color, national origin, age, disability, sex, sexual orientation, or gender identity.            Thank you!     Thank you for choosing [facility]  for your care. Our goal is always to provide you with excellent care. Hearing back from our patients is one way we can continue to improve our services. Please take a few minutes to complete the written survey that you may receive in the mail after your visit with us. Thank you!             Your Updated Medication List - Protect others around you: Learn how to safely use, store and throw away your medicines at www.disposemymeds.org.          This list is accurate as of: 12/4/17  9:41 AM.  Always use your most recent med list.                   Brand Name Dispense Instructions for use Diagnosis    alcohol swab prep pads     100 each    Use to swab area of injection/keith as directed.    Lightheadedness       blood " glucose calibration solution    NO BRAND SPECIFIED    1 Bottle    To accompany: Blood Glucose Monitor Brands: per insurance.    [facility]       blood glucose monitoring meter device kit    no brand specified    1 kit    Use to test blood sugar 3 times daily or as directed. Preferred blood glucose meter OR supplies to accompany: Blood Glucose Monitor Brands: per insurance.    [facility]       blood glucose monitoring test strip    no brand specified    100 strip    Use to test blood sugar 3 times daily or as directed. To accompany: Blood Glucose Monitor Brands: per insurance.    LightheadedVenda       breast pump Misc     1 each    1 each daily    High-risk pregnancy, elderly multigravida, second trimester       PRENATAL VITAMIN PO      Take 1 tablet by mouth.        thin lancets    NO BRAND SPECIFIED    100 each    Use with lanceting device. To accompany: Blood Glucose Monitor Brands: per insurance.    [facility]

## 2017-12-21 ENCOUNTER — OFFICE VISIT (OUTPATIENT)
Dept: FAMILY MEDICINE | Facility: CLINIC | Age: 37
End: 2017-12-21
Payer: COMMERCIAL

## 2017-12-21 VITALS
RESPIRATION RATE: 16 BRPM | TEMPERATURE: 98.1 F | BODY MASS INDEX: 23.99 KG/M2 | DIASTOLIC BLOOD PRESSURE: 64 MMHG | WEIGHT: 140.5 LBS | OXYGEN SATURATION: 96 % | SYSTOLIC BLOOD PRESSURE: 104 MMHG | HEART RATE: 64 BPM | HEIGHT: 64 IN

## 2017-12-21 DIAGNOSIS — Z00.00 ROUTINE GENERAL MEDICAL EXAMINATION AT A HEALTH CARE FACILITY: Primary | ICD-10-CM

## 2017-12-21 PROCEDURE — 82306 VITAMIN D 25 HYDROXY: CPT | Performed by: NURSE PRACTITIONER

## 2017-12-21 PROCEDURE — 99395 PREV VISIT EST AGE 18-39: CPT | Performed by: NURSE PRACTITIONER

## 2017-12-21 PROCEDURE — 82947 ASSAY GLUCOSE BLOOD QUANT: CPT | Performed by: NURSE PRACTITIONER

## 2017-12-21 PROCEDURE — 36415 COLL VENOUS BLD VENIPUNCTURE: CPT | Performed by: NURSE PRACTITIONER

## 2017-12-21 PROCEDURE — 80061 LIPID PANEL: CPT | Performed by: NURSE PRACTITIONER

## 2017-12-21 ASSESSMENT — PAIN SCALES - GENERAL: PAINLEVEL: NO PAIN (0)

## 2017-12-21 NOTE — NURSING NOTE
"Chief Complaint   Patient presents with     Forms     Biometric form     Blood Draw     LABS.  Patient is fasting     Initial /64 (BP Location: Right arm, Patient Position: Chair, Cuff Size: Adult Regular)  Pulse 64  Temp 98.1  F (36.7  C) (Oral)  Resp 16  Ht 5' 4\" (1.626 m)  Wt 140 lb 8 oz (63.7 kg)  SpO2 96%  BMI 24.12 kg/m2 Estimated body mass index is 24.12 kg/(m^2) as calculated from the following:    Height as of this encounter: 5' 4\" (1.626 m).    Weight as of this encounter: 140 lb 8 oz (63.7 kg).  BP completed using cuff size regular right arm.    Lisa Magill, CMA    "
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
## 2017-12-21 NOTE — MR AVS SNAPSHOT
After Visit Summary   12/21/2017    Keisha Espinoza    MRN: [id number]           Patient Information     Date Of Birth          1980        Visit Information        Provider Department      12/21/2017 8:00 AM Nafisa Richards APRN [facility]        Today's Diagnoses     Routine general medical examination at a health care facility    -  1      Care Instructions      Preventive Health Recommendations  Female Ages 26 - 39  Yearly exam:   See your health care provider every year in order to    Review health changes.     Discuss preventive care.      Review your medicines if you your doctor has prescribed any.    Until age 30: Get a Pap test every three years (more often if you have had an abnormal result).    After age 30: Talk to your doctor about whether you should have a Pap test every 3 years or have a Pap test with HPV screening every 5 years.   You do not need a Pap test if your uterus was removed (hysterectomy) and you have not had cancer.  You should be tested each year for STDs (sexually transmitted diseases), if you're at risk.   Talk to your provider about how often to have your cholesterol checked.  If you are at risk for diabetes, you should have a diabetes test (fasting glucose).  Shots: Get a flu shot each year. Get a tetanus shot every 10 years.   Nutrition:     Eat at least 5 servings of fruits and vegetables each day.    Eat whole-grain bread, whole-wheat pasta and brown rice instead of white grains and rice.    Talk to your provider about Calcium and Vitamin D.     Lifestyle    Exercise at least 150 minutes a week (30 minutes a day, 5 days of the week). This will help you control your weight and prevent disease.    Limit alcohol to one drink per day.    No smoking.     Wear sunscreen to prevent skin cancer.    See your dentist every six months for an exam and cleaning.    Preventive Health Recommendations  Female Ages 26 - 39  Yearly exam:   See your  health care provider every year in order to    Review health changes.     Discuss preventive care.      Review your medicines if you your doctor has prescribed any.    Until age 30: Get a Pap test every three years (more often if you have had an abnormal result).    After age 30: Talk to your doctor about whether you should have a Pap test every 3 years or have a Pap test with HPV screening every 5 years.   You do not need a Pap test if your uterus was removed (hysterectomy) and you have not had cancer.  You should be tested each year for STDs (sexually transmitted diseases), if you're at risk.   Talk to your provider about how often to have your cholesterol checked.  If you are at risk for diabetes, you should have a diabetes test (fasting glucose).  Shots: Get a flu shot each year. Get a tetanus shot every 10 years.   Nutrition:     Eat at least 5 servings of fruits and vegetables each day.    Eat whole-grain bread, whole-wheat pasta and brown rice instead of white grains and rice.    Talk to your provider about Calcium and Vitamin D.     Lifestyle    Exercise at least 150 minutes a week (30 minutes a day, 5 days of the week). This will help you control your weight and prevent disease.    Limit alcohol to one drink per day.    No smoking.     Wear sunscreen to prevent skin cancer.    See your dentist every six months for an exam and cleaning.            Follow-ups after your visit        Who to contact     If you have questions or need follow up information about today's clinic visit or your schedule please contact [facility] directly at [phone].  Normal or non-critical lab and imaging results will be communicated to you by MyChart, letter or phone within 4 business days after the clinic has received the results. If you do not hear from us within 7 days, please contact the clinic through MyChart or phone. If you have a critical or abnormal lab result, we will notify you by phone as soon as  "possible.  Submit refill requests through [employer] or call your pharmacy and they will forward the refill request to us. Please allow 3 business days for your refill to be completed.          Additional Information About Your Visit        [employer] Information     [employer] gives you secure access to your electronic health record. If you see a primary care provider, you can also send messages to your care team and make appointments. If you have questions, please call your primary care clinic.  If you do not have a primary care provider, please call [phone] and they will assist you.        Care EveryWhere ID     This is your Care EveryWhere ID. This could be used by other organizations to access your [place] medical records  [id number]        Your Vitals Were     Pulse Temperature Respirations Height Pulse Oximetry BMI (Body Mass Index)    64 98.1  F (36.7  C) (Oral) 16 5' 4\" (1.626 m) 96% 24.12 kg/m2       Blood Pressure from Last 3 Encounters:   12/21/17 104/64   12/04/17 100/66   07/25/17 110/70    Weight from Last 3 Encounters:   12/21/17 140 lb 8 oz (63.7 kg)   12/04/17 143 lb 8 oz (65.1 kg)   07/25/17 141 lb 8 oz (64.2 kg)              We Performed the Following     Glucose     Lipid panel reflex to direct LDL Fasting     Vitamin D Deficiency        Primary Care Provider Office Phone # Fax #    Eric Michael Wilson [phone]       [id number]  [facility] [address]        Equal Access to Services     [facility] : Hadii aad ku hadasho Soomaali, waaxda luqadaha, qaybta kaalmada biggegyada, victoria borges . So [place] [phone].    ATENCIÓN: Si habla español, tiene a castillo disposición servicios gratuitos de asistencia lingüística. Llame al [phone].    We comply with applicable federal civil rights laws and [place] laws. We do not discriminate on the basis of race, color, national origin, age, disability, sex, sexual orientation, or gender identity.       "      Thank you!     Thank you for choosing [facility]  for your care. Our goal is always to provide you with excellent care. Hearing back from our patients is one way we can continue to improve our services. Please take a few minutes to complete the written survey that you may receive in the mail after your visit with us. Thank you!             Your Updated Medication List - Protect others around you: Learn how to safely use, store and throw away your medicines at www.disposemymeds.org.          This list is accurate as of: 12/21/17  8:54 AM.  Always use your most recent med list.                   Brand Name Dispense Instructions for use Diagnosis    PRENATAL VITAMIN PO      Take 1 tablet by mouth.

## 2017-12-22 LAB
CHOLEST SERPL-MCNC: 196 MG/DL
DEPRECATED CALCIDIOL+CALCIFEROL SERPL-MC: 30 UG/L (ref 20–75)
GLUCOSE SERPL-MCNC: 75 MG/DL (ref 70–99)
HDLC SERPL-MCNC: 63 MG/DL
LDLC SERPL CALC-MCNC: 120 MG/DL
NONHDLC SERPL-MCNC: 133 MG/DL
TRIGL SERPL-MCNC: 65 MG/DL

## 2018-01-17 ENCOUNTER — OFFICE VISIT (OUTPATIENT)
Dept: FAMILY MEDICINE | Facility: CLINIC | Age: 38
End: 2018-01-17
Payer: COMMERCIAL

## 2018-01-17 VITALS
RESPIRATION RATE: 16 BRPM | BODY MASS INDEX: 24.41 KG/M2 | OXYGEN SATURATION: 100 % | HEART RATE: 69 BPM | HEIGHT: 64 IN | SYSTOLIC BLOOD PRESSURE: 114 MMHG | WEIGHT: 143 LBS | TEMPERATURE: 98.2 F | DIASTOLIC BLOOD PRESSURE: 66 MMHG

## 2018-01-17 DIAGNOSIS — L01.00 IMPETIGO: ICD-10-CM

## 2018-01-17 DIAGNOSIS — M79.662 PAIN OF LEFT LOWER LEG: Primary | ICD-10-CM

## 2018-01-17 LAB — D DIMER PPP FEU-MCNC: <0.1 UG/ML FEU (ref 0–0.5)

## 2018-01-17 PROCEDURE — 99214 OFFICE O/P EST MOD 30 MIN: CPT | Performed by: FAMILY MEDICINE

## 2018-01-17 PROCEDURE — 85379 FIBRIN DEGRADATION QUANT: CPT | Performed by: FAMILY MEDICINE

## 2018-01-17 PROCEDURE — 36415 COLL VENOUS BLD VENIPUNCTURE: CPT | Performed by: FAMILY MEDICINE

## 2018-01-17 RX ORDER — TOBRAMYCIN AND DEXAMETHASONE 3; 1 MG/ML; MG/ML
SUSPENSION/ DROPS OPHTHALMIC
Refills: 0 | COMMUNITY
Start: 2018-01-05 | End: 2018-02-02

## 2018-01-17 RX ORDER — MUPIROCIN CALCIUM 20 MG/G
CREAM TOPICAL 3 TIMES DAILY
Qty: 15 G | Refills: 0 | Status: SHIPPED | OUTPATIENT
Start: 2018-01-17 | End: 2018-05-18

## 2018-01-17 ASSESSMENT — ENCOUNTER SYMPTOMS
SHORTNESS OF BREATH: 0
FOCAL WEAKNESS: 0
MYALGIAS: 1
COUGH: 0
CONSTITUTIONAL NEGATIVE: 1
SENSORY CHANGE: 0
TINGLING: 0

## 2018-01-17 NOTE — PROGRESS NOTES
HPI    SUBJECTIVE:   Keisha Espinoza is a 37 year old female who presents to clinic today for the following health issues:    Joint Pain    Onset: x2 weeks    Description:   Location: left calf and back of knee  Character: Cramping    Intensity: mild    Progression of Symptoms: worse    Accompanying Signs & Symptoms:  Other symptoms: none    History:   Previous similar pain: no; sister had a DVT and PE and advised that she be followed up    Precipitating factors:   Trauma or overuse: no     Alleviating factors:  Improved by: rest/inactivity; sleep; raising her feelt    Therapies Tried and outcome: None    Sister has a history of DVT and PE and has urged Keisha to have leg pain looked at.  Seems to be worse at the end of a long day, also when standing long periods of time. Pain is more achy, inner aspect of L calf.  Pain sometimes behind knee.   No redness, tenderness, swelling.  No cough or shortness of breath.  No personal history of DVT/PE.  NO tobacco, condoms for birth control.    Also noting some d/c and crusting in L ear for a week or so.    Sister's DVT assumed to be due to OCP, no coagulopathy identified.    Review of Systems   Constitutional: Negative.    Respiratory: Negative for cough and shortness of breath.    Cardiovascular: Negative for chest pain.   Musculoskeletal: Positive for joint pain and myalgias.   Neurological: Negative for tingling, sensory change and focal weakness.         Physical Exam   Constitutional: She is oriented to person, place, and time and well-developed, well-nourished, and in no distress.   HENT:   Erythema, crusting, serous type d/c on L EAC   Eyes: Conjunctivae and EOM are normal.   Cardiovascular: Normal rate, regular rhythm and normal heart sounds.    Pulmonary/Chest: Effort normal and breath sounds normal.   Musculoskeletal: She exhibits no edema.        Left lower leg: She exhibits no tenderness, no swelling and no deformity.   Neurological: She is alert and oriented  to person, place, and time.   Skin: Skin is warm and dry.   Vitals reviewed.    (M79.662) Pain of left lower leg  (primary encounter diagnosis)  Comment: if positive would f/u with US, but very low risk.. Suspect small Baker's cyst  Plan: D dimer quantitative            (L01.00) Impetigo  Comment:   Plan: mupirocin (BACTROBAN) 2 % cream              RTC in 2w prn    Eric Wilson MD

## 2018-01-17 NOTE — NURSING NOTE
"Chief Complaint   Patient presents with     Musculoskeletal Problem     left calf pain x2 weeks       Initial /66  Pulse 69  Temp 98.2  F (36.8  C) (Oral)  Resp 16  Ht 5' 3.5\" (1.613 m)  Wt 143 lb (64.9 kg)  SpO2 100%  Breastfeeding? Yes  BMI 24.93 kg/m2 Estimated body mass index is 24.93 kg/(m^2) as calculated from the following:    Height as of this encounter: 5' 3.5\" (1.613 m).    Weight as of this encounter: 143 lb (64.9 kg).  Medication Reconciliation: complete   Viki Razo CMA (AAMA)      "

## 2018-01-17 NOTE — MR AVS SNAPSHOT
"              After Visit Summary   1/17/2018    Keisha Espinoza    MRN: [id number]           Patient Information     Date Of Birth          1980        Visit Information        Provider Department      1/17/2018 8:00 AM Eric Wilson MD [facility]        Today's Diagnoses     Pain of left lower leg    -  1    Impetigo           Follow-ups after your visit        Follow-up notes from your care team     Return in about 2 weeks (around 1/31/2018), or if symptoms worsen or fail to improve.      Who to contact     If you have questions or need follow up information about today's clinic visit or your schedule please contact [facility] directly at [phone].  Normal or non-critical lab and imaging results will be communicated to you by MyChart, letter or phone within 4 business days after the clinic has received the results. If you do not hear from us within 7 days, please contact the clinic through ACKme Networkshart or phone. If you have a critical or abnormal lab result, we will notify you by phone as soon as possible.  Submit refill requests through Triventus or call your pharmacy and they will forward the refill request to us. Please allow 3 business days for your refill to be completed.          Additional Information About Your Visit        MyChart Information     Triventus gives you secure access to your electronic health record. If you see a primary care provider, you can also send messages to your care team and make appointments. If you have questions, please call your primary care clinic.  If you do not have a primary care provider, please call [phone] and they will assist you.        Care EveryWhere ID     This is your Care EveryWhere ID. This could be used by other organizations to access your [place] medical records  [id number]        Your Vitals Were     Pulse Temperature Respirations Height Pulse Oximetry Breastfeeding?    69 98.2  F (36.8  C) (Oral) 16 5' 3.5\" " (1.613 m) 100% Yes    BMI (Body Mass Index)                   24.93 kg/m2            Blood Pressure from Last 3 Encounters:   01/17/18 114/66   12/21/17 104/64   12/04/17 100/66    Weight from Last 3 Encounters:   01/17/18 143 lb (64.9 kg)   12/21/17 140 lb 8 oz (63.7 kg)   12/04/17 143 lb 8 oz (65.1 kg)              We Performed the Following     D dimer quantitative          Today's Medication Changes          These changes are accurate as of: 1/17/18  8:21 AM.  If you have any questions, ask your nurse or doctor.               Start taking these medicines.        Dose/Directions    mupirocin 2 % cream   Commonly known as:  BACTROBAN   Used for:  Impetigo   Started by:  Eric Wilson MD        Apply topically 3 times daily   Quantity:  15 g   Refills:  0            Where to get your medicines      These medications were sent to [facility] #[id number] - [place], [place] - [address]  [facility]  [address] [facility] [address]     Phone:  [phone]     mupirocin 2 % cream                Primary Care Provider Office Phone # Fax #    Eric Wilson [phone]       [address] [facility] [address]        Equal Access to Services     TANG CHEUNG AH: Hadii aad ku hadasho Soomaali, waaxda luqadaha, qaybta kaalmada adeegyada, waxay idiin hayaan rudi ames. So [facility] [phone].    ATENCIÓN: Si habla español, tiene a castillo disposición servicios gratuitos de asistencia lingüística. Llame al [phone].    We comply with applicable federal civil rights laws and [place] laws. We do not discriminate on the basis of race, color, national origin, age, disability, sex, sexual orientation, or gender identity.            Thank you!     Thank you for choosing [facility]  for your care. Our goal is always to provide you with excellent care. Hearing back from our patients is one way we can continue to improve our services. Please take a few minutes to complete  the written survey that you may receive in the mail after your visit with us. Thank you!             Your Updated Medication List - Protect others around you: Learn how to safely use, store and throw away your medicines at www.disposemymeds.org.          This list is accurate as of: 1/17/18  8:21 AM.  Always use your most recent med list.                   Brand Name Dispense Instructions for use Diagnosis    mupirocin 2 % cream    BACTROBAN    15 g    Apply topically 3 times daily    Impetigo       PRENATAL VITAMIN PO      Take 1 tablet by mouth.        tobramycin-dexamethasone 0.3-0.1 % ophthalmic susp    TOBRADEX     PLACE 1 DROP INTO LEFT EYE 4 TIMES DAILY FOR 7 DAYS.

## 2018-02-02 ENCOUNTER — OFFICE VISIT (OUTPATIENT)
Dept: FAMILY MEDICINE | Facility: CLINIC | Age: 38
End: 2018-02-02
Payer: COMMERCIAL

## 2018-02-02 VITALS
WEIGHT: 142.5 LBS | HEIGHT: 64 IN | DIASTOLIC BLOOD PRESSURE: 62 MMHG | OXYGEN SATURATION: 98 % | RESPIRATION RATE: 18 BRPM | BODY MASS INDEX: 24.33 KG/M2 | SYSTOLIC BLOOD PRESSURE: 102 MMHG | HEART RATE: 71 BPM | TEMPERATURE: 98.3 F

## 2018-02-02 DIAGNOSIS — H69.93 DYSFUNCTION OF BOTH EUSTACHIAN TUBES: Primary | ICD-10-CM

## 2018-02-02 PROCEDURE — 99213 OFFICE O/P EST LOW 20 MIN: CPT | Performed by: PHYSICIAN ASSISTANT

## 2018-02-02 ASSESSMENT — ANXIETY QUESTIONNAIRES
2. NOT BEING ABLE TO STOP OR CONTROL WORRYING: NOT AT ALL
GAD7 TOTAL SCORE: 0
3. WORRYING TOO MUCH ABOUT DIFFERENT THINGS: NOT AT ALL
1. FEELING NERVOUS, ANXIOUS, OR ON EDGE: NOT AT ALL
7. FEELING AFRAID AS IF SOMETHING AWFUL MIGHT HAPPEN: NOT AT ALL
5. BEING SO RESTLESS THAT IT IS HARD TO SIT STILL: NOT AT ALL
IF YOU CHECKED OFF ANY PROBLEMS ON THIS QUESTIONNAIRE, HOW DIFFICULT HAVE THESE PROBLEMS MADE IT FOR YOU TO DO YOUR WORK, TAKE CARE OF THINGS AT HOME, OR GET ALONG WITH OTHER PEOPLE: NOT DIFFICULT AT ALL
6. BECOMING EASILY ANNOYED OR IRRITABLE: NOT AT ALL

## 2018-02-02 ASSESSMENT — PATIENT HEALTH QUESTIONNAIRE - PHQ9: 5. POOR APPETITE OR OVEREATING: NOT AT ALL

## 2018-02-02 NOTE — MR AVS SNAPSHOT
"              After Visit Summary   2/2/2018    Keisha Espinoza    MRN: [id number]           Patient Information     Date Of Birth          1980        Visit Information        Provider Department      2/2/2018 1:10 PM Grecia Gonzalez PA-C [facility]        Today's Diagnoses     Dysfunction of both eustachian tubes    -  1       Follow-ups after your visit        Who to contact     If you have questions or need follow up information about today's clinic visit or your schedule please contact [facility] directly at [phone].  Normal or non-critical lab and imaging results will be communicated to you by XLeranthart, letter or phone within 4 business days after the clinic has received the results. If you do not hear from us within 7 days, please contact the clinic through Whittlt or phone. If you have a critical or abnormal lab result, we will notify you by phone as soon as possible.  Submit refill requests through Booster or call your pharmacy and they will forward the refill request to us. Please allow 3 business days for your refill to be completed.          Additional Information About Your Visit        MyChart Information     Booster gives you secure access to your electronic health record. If you see a primary care provider, you can also send messages to your care team and make appointments. If you have questions, please call your primary care clinic.  If you do not have a primary care provider, please call [phone] and they will assist you.        Care EveryWhere ID     This is your Care EveryWhere ID. This could be used by other organizations to access your [place] medical records  [id number]        Your Vitals Were     Pulse Temperature Respirations Height Last Period Pulse Oximetry    71 98.3  F (36.8  C) (Oral) 18 5' 3.5\" (1.613 m) 02/02/2018 (Exact Date) 98%    Breastfeeding? BMI (Body Mass Index)                No 24.85 kg/m2           Blood Pressure " from Last 3 Encounters:   02/02/18 102/62   01/17/18 114/66   12/21/17 104/64    Weight from Last 3 Encounters:   02/02/18 142 lb 8 oz (64.6 kg)   01/17/18 143 lb (64.9 kg)   12/21/17 140 lb 8 oz (63.7 kg)              Today, you had the following     No orders found for display       Primary Care Provider Office Phone # Fax #    Eric Michael Wilson [phone]       [address]  [place] [address]        Equal Access to Services     [facility]: Hadii aad ku hadasho Soomaali, waaxda luqadaha, qaybta kaalmada adeegyada, victoria harper hayector adelinda borges . So [facility] [phone].    ATENCIÓN: Si habla español, tiene a castillo disposición servicios gratuitos de asistencia lingüística. [facility] [phone].    We comply with applicable federal civil rights laws and [place] laws. We do not discriminate on the basis of race, color, national origin, age, disability, sex, sexual orientation, or gender identity.            Thank you!     Thank you for choosing [facility]  for your care. Our goal is always to provide you with excellent care. Hearing back from our patients is one way we can continue to improve our services. Please take a few minutes to complete the written survey that you may receive in the mail after your visit with us. Thank you!             Your Updated Medication List - Protect others around you: Learn how to safely use, store and throw away your medicines at www.disposemymeds.org.          This list is accurate as of 2/2/18  1:13 PM.  Always use your most recent med list.                   Brand Name Dispense Instructions for use Diagnosis    mupirocin 2 % cream    BACTROBAN    15 g    Apply topically 3 times daily    Impetigo       PRENATAL VITAMIN PO      Take 1 tablet by mouth.

## 2018-02-02 NOTE — PROGRESS NOTES
SUBJECTIVE:   Keisha Espinoza is a 37 year old female who presents to clinic today for the following health issues:      Ear Problem      Duration: last two weeks ago    Description (location/character/radiation): both ears, popping feeling, feels like she's on an airplane, comes and goes    Intensity:  Moderate when occuring    Accompanying signs and symptoms: occ mild ache    History (similar episodes/previous evaluation): None    Precipitating or alleviating factors: None    Therapies tried and outcome: rx topical cream helped itching but popping hasn't gone away     Was seen about 2 weeks ago for itching in her ears, that has gone away with Bactroban cream but now popping/fullness has developed  She denies fever, chills, runny nose, congestion or cough  Taking no other medication for this  No ear discharge, no ringing in ears      Problem list and histories reviewed & adjusted, as indicated.  Additional history: as documented    Patient Active Problem List   Diagnosis     CARDIOVASCULAR SCREENING; LDL GOAL LESS THAN 160     Short DC-normal QRS complex syndrome     History of tobacco abuse     Umbilical hernia without obstruction and without gangrene     REN (generalized anxiety disorder)     Past Surgical History:   Procedure Laterality Date     EXTRACTION(S) DENTAL         Social History   Substance Use Topics     Smoking status: Former Smoker     Years: 10.00     Smokeless tobacco: Never Used      Comment: quit 2004     Alcohol use No      Comment: social     Family History   Problem Relation Age of Onset     DIABETES Mother      Hypertension Mother      Other Cancer Mother      Hypertension Father      Heart Defect Father      Afib     Prostate Cancer Father      Other Cancer Father      Cancer - colorectal Paternal Grandmother      Lung Cancer Maternal Grandfather      Melanoma Paternal Grandfather      Breast Cancer No family hx of          Current Outpatient Prescriptions   Medication Sig Dispense  "Refill     mupirocin (BACTROBAN) 2 % cream Apply topically 3 times daily 15 g 0     Prenatal Vit-Fe Sulfate-FA (PRENATAL VITAMIN OR) Take 1 tablet by mouth.       Allergies   Allergen Reactions     Sulfa Drugs Rash       Reviewed and updated as needed this visit by clinical staff  Tobacco  Allergies  Med Hx  Surg Hx  Fam Hx  Soc Hx      Reviewed and updated as needed this visit by Provider         ROS:  Constitutional, HEENT, cardiovascular, pulmonary, gi and gu systems are negative, except as otherwise noted.    OBJECTIVE:     /62 (BP Location: Right arm, Patient Position: Chair, Cuff Size: Adult Regular)  Pulse 71  Temp 98.3  F (36.8  C) (Oral)  Resp 18  Ht 5' 3.5\" (1.613 m)  Wt 142 lb 8 oz (64.6 kg)  LMP 02/02/2018 (Exact Date)  SpO2 98%  Breastfeeding? No  BMI 24.85 kg/m2  Body mass index is 24.85 kg/(m^2).  GENERAL: healthy, alert and no distress  EYES: Eyes grossly normal to inspection, PERRL and conjunctivae and sclerae normal  HENT: ear canals and TM's normal, nose and mouth without ulcers or lesions  NECK: no adenopathy, no asymmetry, masses, or scars and thyroid normal to palpation  RESP: lungs clear to auscultation - no rales, rhonchi or wheezes  CV: regular rate and rhythm, normal S1 S2, no S3 or S4, no murmur, click or rub, no peripheral edema and peripheral pulses strong  MS: no gross musculoskeletal defects noted, no edema    Diagnostic Test Results:  none     ASSESSMENT/PLAN:             1. Dysfunction of both eustachian tubes  New problem, discussed normal anatomy on exam. Could try auto-insufflation or anti-histamine. Recommend observation and monitoring. F/U if symptoms worsen or do not improve.      Risks, benefits and alternatives were discussed with patient. Agreeable to the plan of care.      Grecia Gonzalez PA-C  [facility]  "

## 2018-02-02 NOTE — NURSING NOTE
"Chief Complaint   Patient presents with     Ear Problem       Initial /62 (BP Location: Right arm, Patient Position: Chair, Cuff Size: Adult Regular)  Pulse 71  Temp 98.3  F (36.8  C) (Oral)  Resp 18  Ht 5' 3.5\" (1.613 m)  Wt 142 lb 8 oz (64.6 kg)  LMP 02/02/2018 (Exact Date)  SpO2 98%  Breastfeeding? No  BMI 24.85 kg/m2 Estimated body mass index is 24.85 kg/(m^2) as calculated from the following:    Height as of this encounter: 5' 3.5\" (1.613 m).    Weight as of this encounter: 142 lb 8 oz (64.6 kg).  Medication Reconciliation: complete   Kusum Lynch CMA (AAMA)    "

## 2018-02-03 ASSESSMENT — PATIENT HEALTH QUESTIONNAIRE - PHQ9: SUM OF ALL RESPONSES TO PHQ QUESTIONS 1-9: 1

## 2018-02-03 ASSESSMENT — ANXIETY QUESTIONNAIRES: GAD7 TOTAL SCORE: 0

## 2018-05-18 ENCOUNTER — OFFICE VISIT (OUTPATIENT)
Dept: FAMILY MEDICINE | Facility: CLINIC | Age: 38
End: 2018-05-18
Payer: COMMERCIAL

## 2018-05-18 VITALS
HEIGHT: 64 IN | BODY MASS INDEX: 24.11 KG/M2 | SYSTOLIC BLOOD PRESSURE: 118 MMHG | TEMPERATURE: 98.2 F | RESPIRATION RATE: 16 BRPM | OXYGEN SATURATION: 100 % | DIASTOLIC BLOOD PRESSURE: 82 MMHG | WEIGHT: 141.2 LBS | HEART RATE: 67 BPM

## 2018-05-18 DIAGNOSIS — N61.0 MASTITIS, RIGHT, ACUTE: Primary | ICD-10-CM

## 2018-05-18 PROCEDURE — 99213 OFFICE O/P EST LOW 20 MIN: CPT | Performed by: PHYSICIAN ASSISTANT

## 2018-05-18 RX ORDER — DICLOXACILLIN SODIUM 500 MG
500 CAPSULE ORAL 4 TIMES DAILY
Qty: 28 CAPSULE | Refills: 0 | Status: SHIPPED | OUTPATIENT
Start: 2018-05-18 | End: 2018-05-25

## 2018-05-18 NOTE — PROGRESS NOTES
SUBJECTIVE:   Keisha Espinoza is a 38 year old female who presents to clinic today for the following health issues:      Breast Pain      Duration: since yesterday    Description (location/character/radiation): whole right breast    Intensity:  Moderate    Accompanying signs and symptoms: possibly swollen, is more tender; also has chills    History (similar episodes/previous evaluation): has been breast feeding about 1.5 years    Precipitating or alleviating factors: None    Therapies tried and outcome: tylenol helps a little     Patient is here today for evaluation of right breast pain  Ongoing for about a day  She feels chilled and achy  She describes right breast as feeling more tender and swollen, no lumps, no abnormal discharge  Is still breastfeeding son who is 20 months, he has no sores or rashes in mouth  Taking tylenol with little relief  No redness, no lymph node swelling    Problem list and histories reviewed & adjusted, as indicated.  Additional history: as documented    Patient Active Problem List   Diagnosis     CARDIOVASCULAR SCREENING; LDL GOAL LESS THAN 160     Short NE-normal QRS complex syndrome     History of tobacco abuse     Umbilical hernia without obstruction and without gangrene     REN (generalized anxiety disorder)     Past Surgical History:   Procedure Laterality Date     EXTRACTION(S) DENTAL         Social History   Substance Use Topics     Smoking status: Former Smoker     Years: 10.00     Smokeless tobacco: Never Used      Comment: quit 2004     Alcohol use No      Comment: social     Family History   Problem Relation Age of Onset     DIABETES Mother      Hypertension Mother      Other Cancer Mother      Hypertension Father      Heart Defect Father      Afib     Prostate Cancer Father      Other Cancer Father      Cancer - colorectal Paternal Grandmother      Lung Cancer Maternal Grandfather      Melanoma Paternal Grandfather      Breast Cancer No family hx of          Current  "Outpatient Prescriptions   Medication Sig Dispense Refill     dicloxacillin (DYNAPEN) 500 MG capsule Take 1 capsule (500 mg) by mouth 4 times daily for 7 days 28 capsule 0     Prenatal Vit-Fe Sulfate-FA (PRENATAL VITAMIN OR) Take 1 tablet by mouth.       Allergies   Allergen Reactions     Sulfa Drugs Rash       Reviewed and updated as needed this visit by clinical staff  Tobacco  Allergies  Meds  Med Hx  Surg Hx  Fam Hx  Soc Hx      Reviewed and updated as needed this visit by Provider         ROS:  Constitutional, HEENT, cardiovascular, pulmonary, gi and gu systems are negative, except as otherwise noted.    OBJECTIVE:     /82 (BP Location: Right arm, Patient Position: Chair, Cuff Size: Adult Regular)  Pulse 67  Temp 98.2  F (36.8  C) (Oral)  Resp 16  Ht 5' 3.5\" (1.613 m)  Wt 141 lb 3.2 oz (64 kg)  LMP 04/18/2018 (Approximate)  SpO2 100%  Breastfeeding? Yes  BMI 24.62 kg/m2  Body mass index is 24.62 kg/(m^2).  GENERAL: healthy, alert and no distress  NECK: no adenopathy, no asymmetry, masses, or scars and thyroid normal to palpation  RESP: lungs clear to auscultation - no rales, rhonchi or wheezes  BREAST: normal without masses, tenderness or nipple discharge and no palpable axillary masses or adenopathy however right breast is warmer to touch than left breast, and there is an area of more fullness though no lump on right outer breast  CV: regular rate and rhythm, normal S1 S2, no S3 or S4, no murmur, click or rub, no peripheral edema and peripheral pulses strong  MS: no gross musculoskeletal defects noted, no edema    Diagnostic Test Results:  none     ASSESSMENT/PLAN:             1. Mastitis, right, acute  New problem, suspect early mastitis given history.   Recommend treating with Dicloxacillin for 1 week.   Discussed if pain persists or worsens, palpable lump, fever needs to be seen for recheck. May consider UC over weekend if worsening.  If not improved will consider diagnostic " mammogram.  - dicloxacillin (DYNAPEN) 500 MG capsule; Take 1 capsule (500 mg) by mouth 4 times daily for 7 days  Dispense: 28 capsule; Refill: 0    Risks, benefits and alternatives were discussed with patient. Agreeable to the plan of care.      Grecia Gonzalez PA-C  [facility]

## 2018-05-18 NOTE — MR AVS SNAPSHOT
"              After Visit Summary   5/18/2018    Keisha Espinoza    MRN: [id number]           Patient Information     Date Of Birth          1980        Visit Information        Provider Department      5/18/2018 1:50 PM Grecia Gonzalez PA-C [facility]        Today's Diagnoses     Mastitis, right, acute    -  1       Follow-ups after your visit        Follow-up notes from your care team     Return in about 7 months (around 12/18/2018) for Physical Exam.      Who to contact     If you have questions or need follow up information about today's clinic visit or your schedule please contact [facility] directly at [phone].  Normal or non-critical lab and imaging results will be communicated to you by MyChart, letter or phone within 4 business days after the clinic has received the results. If you do not hear from us within 7 days, please contact the clinic through 22seedshart or phone. If you have a critical or abnormal lab result, we will notify you by phone as soon as possible.  Submit refill requests through Deemelo or call your pharmacy and they will forward the refill request to us. Please allow 3 business days for your refill to be completed.          Additional Information About Your Visit        MyChart Information     Deemelo gives you secure access to your electronic health record. If you see a primary care provider, you can also send messages to your care team and make appointments. If you have questions, please call your primary care clinic.  If you do not have a primary care provider, please call [phone] and they will assist you.        Care EveryWhere ID     This is your Care EveryWhere ID. This could be used by other organizations to access your [place] medical records  [id number]        Your Vitals Were     Pulse Temperature Respirations Height Last Period Pulse Oximetry    67 98.2  F (36.8  C) (Oral) 16 5' 3.5\" (1.613 m) 04/18/2018 (Approximate) " 100%    Breastfeeding? BMI (Body Mass Index)                Yes 24.62 kg/m2           Blood Pressure from Last 3 Encounters:   05/18/18 118/82   02/02/18 102/62   01/17/18 114/66    Weight from Last 3 Encounters:   05/18/18 141 lb 3.2 oz (64 kg)   02/02/18 142 lb 8 oz (64.6 kg)   01/17/18 143 lb (64.9 kg)              Today, you had the following     No orders found for display         Today's Medication Changes          These changes are accurate as of 5/18/18  2:03 PM.  If you have any questions, ask your nurse or doctor.               Start taking these medicines.        Dose/Directions    dicloxacillin 500 MG capsule   Commonly known as:  DYNAPEN   Used for:  Mastitis, right, acute   Started by:  Grecia Gonzalez PA-C        Dose:  500 mg   Take 1 capsule (500 mg) by mouth 4 times daily for 7 days   Quantity:  28 capsule   Refills:  0            Where to get your medicines      These medications were sent to [facility] [id number]  [address] [facility] [address]     Phone:  [phone]     dicloxacillin 500 MG capsule                Primary Care Provider Office Phone # Fax #    Eric Michael Wilson [phone]       [address] [facility] [address]        Equal Access to Services     TANG CHEUNG AH: Hadii aad ku hadasho Soomaali, waaxda luqadaha, qaybta kaalmada adeegyada, victoria ames. So [facility] [phone].    ATENCIÓN: Si habla español, tiene a castillo disposición servicios gratuitos de asistencia lingüística. Theo al [phone].    We comply with applicable federal civil rights laws and [place] laws. We do not discriminate on the basis of race, color, national origin, age, disability, sex, sexual orientation, or gender identity.            Thank you!     Thank you for choosing [facility]  for your care. Our goal is always to provide you with excellent care. Hearing back from our  patients is one way we can continue to improve our services. Please take a few minutes to complete the written survey that you may receive in the mail after your visit with us. Thank you!             Your Updated Medication List - Protect others around you: Learn how to safely use, store and throw away your medicines at www.disposemymeds.org.          This list is accurate as of 5/18/18  2:03 PM.  Always use your most recent med list.                   Brand Name Dispense Instructions for use Diagnosis    dicloxacillin 500 MG capsule    DYNAPEN    28 capsule    Take 1 capsule (500 mg) by mouth 4 times daily for 7 days    Mastitis, right, acute       PRENATAL VITAMIN PO      Take 1 tablet by mouth.

## 2018-07-09 ENCOUNTER — OFFICE VISIT (OUTPATIENT)
Dept: FAMILY MEDICINE | Facility: CLINIC | Age: 38
End: 2018-07-09
Payer: COMMERCIAL

## 2018-07-09 VITALS
RESPIRATION RATE: 18 BRPM | HEART RATE: 60 BPM | OXYGEN SATURATION: 98 % | HEIGHT: 64 IN | SYSTOLIC BLOOD PRESSURE: 102 MMHG | WEIGHT: 140 LBS | DIASTOLIC BLOOD PRESSURE: 72 MMHG | TEMPERATURE: 98.1 F | BODY MASS INDEX: 23.9 KG/M2

## 2018-07-09 DIAGNOSIS — F41.1 GAD (GENERALIZED ANXIETY DISORDER): Primary | ICD-10-CM

## 2018-07-09 DIAGNOSIS — M62.08 DIASTASIS OF RECTUS ABDOMINIS: ICD-10-CM

## 2018-07-09 PROCEDURE — 99214 OFFICE O/P EST MOD 30 MIN: CPT | Performed by: FAMILY MEDICINE

## 2018-07-09 ASSESSMENT — ANXIETY QUESTIONNAIRES
6. BECOMING EASILY ANNOYED OR IRRITABLE: NOT AT ALL
GAD7 TOTAL SCORE: 2
7. FEELING AFRAID AS IF SOMETHING AWFUL MIGHT HAPPEN: SEVERAL DAYS
3. WORRYING TOO MUCH ABOUT DIFFERENT THINGS: SEVERAL DAYS
IF YOU CHECKED OFF ANY PROBLEMS ON THIS QUESTIONNAIRE, HOW DIFFICULT HAVE THESE PROBLEMS MADE IT FOR YOU TO DO YOUR WORK, TAKE CARE OF THINGS AT HOME, OR GET ALONG WITH OTHER PEOPLE: SOMEWHAT DIFFICULT
5. BEING SO RESTLESS THAT IT IS HARD TO SIT STILL: NOT AT ALL
2. NOT BEING ABLE TO STOP OR CONTROL WORRYING: NOT AT ALL
1. FEELING NERVOUS, ANXIOUS, OR ON EDGE: NOT AT ALL

## 2018-07-09 ASSESSMENT — ENCOUNTER SYMPTOMS
CONSTITUTIONAL NEGATIVE: 1
NERVOUS/ANXIOUS: 1
INSOMNIA: 0
ABDOMINAL PAIN: 0
DEPRESSION: 0
PALPITATIONS: 1
RESPIRATORY NEGATIVE: 1

## 2018-07-09 ASSESSMENT — LIFESTYLE VARIABLES: SUBSTANCE_ABUSE: 0

## 2018-07-09 ASSESSMENT — PATIENT HEALTH QUESTIONNAIRE - PHQ9: 5. POOR APPETITE OR OVEREATING: NOT AT ALL

## 2018-07-09 NOTE — PROGRESS NOTES
HPI  SUBJECTIVE:   Keisha Espinoza is a 38 year old female who presents to clinic today for the following health issues:    Concern - Fast Heart Beat/Tingling of Fingers  Onset: Ongoing for one day    Description:   Pt states she had a feeling of a rapid heart rate along with tingling in her fingers last night which woke her up from sleeping. Pt denies chest pain. Pt states she has being feeling more stressed lately.     Intensity: moderate    Progression of Symptoms:  Symptoms occurred in one episode.     Accompanying Signs & Symptoms:  Pt states she has been stressed and also has a current headache.     Previous history of similar problem:   Yes. Has happened before when pt has been feeling anxious.     Precipitating factors:   Worsened by: N/A    Alleviating factors:  Improved by: N/A     Therapies Tried and outcome: No therapies have been tried.     Feels she's worrying more, finds this a bit distressing.  Wakes often while asleep.  Did wake with a feeling of a racing heart last night.  Sometimes ruminates.  Health issues with mom.      Lingering HA following a bad cold about a month ago.  Did have notable HA for this, otherwise feeling OK.  May be related to sleep.    Still having issues with rectus diathesis.    Review of Systems   Constitutional: Negative.    Respiratory: Negative.    Cardiovascular: Positive for palpitations.   Gastrointestinal: Negative for abdominal pain.   Psychiatric/Behavioral: Negative for depression and substance abuse. The patient is nervous/anxious. The patient does not have insomnia.          Physical Exam   Constitutional: She is oriented to person, place, and time and well-developed, well-nourished, and in no distress.   Eyes: Conjunctivae and EOM are normal.   Cardiovascular: Normal rate, regular rhythm and normal heart sounds.    Pulmonary/Chest: Effort normal and breath sounds normal.   Abdominal: A hernia is present. Hernia confirmed positive in the umbilical area.    Diastasis recti   Musculoskeletal: She exhibits no edema.   Neurological: She is alert and oriented to person, place, and time.   Skin: Skin is warm and dry.   Vitals reviewed.    (F41.1) REN (generalized anxiety disorder)  (primary encounter diagnosis)  Comment: GAD7 score today is 2, no convincing indication for med, will refer to CBT  Plan: CBT/counsleing contact info given    (M62.08) Diastasis of rectus abdominis  Comment: since pregnancy  Plan: PHYSICAL THERAPY REFERRAL              RTC in  or prn    Eric Wilson MD

## 2018-07-09 NOTE — MR AVS SNAPSHOT
"              After Visit Summary   7/9/2018    Keisha Espinoza    MRN: [id number]           Patient Information     Date Of Birth          1980        Visit Information        Provider Department      7/9/2018 8:20 AM Eric Wilson MD [facility]        Today's Diagnoses     REN (generalized anxiety disorder)    -  1    Diastasis of rectus abdominis           Follow-ups after your visit        Additional Services     PHYSICAL THERAPY REFERRAL       *This therapy referral will be filtered to a centralized scheduling office at [facility] and the patient will receive a call to schedule an appointment at a [facility] location most convenient for them. *     [facility] provides Physical Therapy evaluation and treatment and many specialty services across the [facility] system.  If requesting a specialty program, please choose from the list below.    If you have not heard from the scheduling office within 2 business days, please call [phone] for all locations, with the exception of [facility], please call [phone] and [facility], please call [phone]  Treatment: Evaluation & Treatment  Special Instructions/Modalities:   Special Programs: rectus diastasis program    Please be aware that coverage of these services is subject to the terms and limitations of your health insurance plan.  Call member services at your health plan with any benefit or coverage questions.      **Note to Provider:  If you are referring outside of [facility] for the therapy appointment, please list the name of the location in the \"special instructions\" above, print the referral and give to the patient to schedule the appointment.                  Follow-up notes from your care team     Return in about 3 months (around 10/9/2018).      Who to contact     If you have questions or need follow up information about today's clinic visit or your schedule please contact [facility] directly at [phone].  Normal or non-critical lab and imaging results will be communicated to you by MyChart, letter or phone within 4 business days after the clinic has received the results. If you do not hear from us within 7 days, please contact the clinic through [facility] or phone. If you have a critical or abnormal lab result, we will notify you by phone as soon as possible.  Submit refill requests through [facility] or call your pharmacy and they will forward the refill request to us. Please allow 3 business days for your refill to be completed.          Additional Information About Your Visit        [facility] Information     [facility] gives you secure access to your electronic health record. If you see a primary care provider, you can also send messages to your care team and make appointments. If you have questions, please call your primary care clinic.  If you do not have a primary care provider, please call [phone] and they will assist you.        Care EveryWhere ID     This is your Care EveryWhere ID. This could be used by other organizations to access your [facility] medical records  [id number]        Your Vitals Were     Pulse Temperature Respirations Height Pulse Oximetry BMI (Body Mass Index)    60 98.1  F (36.7  C) (Oral) 18 5' 3.5\" (1.613 m) 98% 24.41 kg/m2       Blood Pressure from Last 3 Encounters:   07/09/18 102/72   05/18/18 118/82   02/02/18 102/62    Weight from Last 3 Encounters:   07/09/18 140 lb (63.5 kg)   05/18/18 141 lb 3.2 oz (64 kg)   02/02/18 142 lb 8 oz (64.6 kg)              We Performed the Following     PHYSICAL THERAPY REFERRAL        Primary Care Provider Office Phone # Fax #    Eric Wilson [phone]       [address] [facility] [address]        Equal Access to Services     TANG CHEUNG AH: Zakiya marino Somatilda, waaxda luqadaha, qaybta kaalmada rudiyaиван, victoria ames. So [facility] " [phone].    ATENCIÓN: Si habla giuseppe, tiene a castillo disposición servicios gratuitos de asistencia lingüística. Theo al [phone].    We comply with applicable federal civil rights laws and [place] laws. We do not discriminate on the basis of race, color, national origin, age, disability, sex, sexual orientation, or gender identity.            Thank you!     Thank you for choosing [facility]  for your care. Our goal is always to provide you with excellent care. Hearing back from our patients is one way we can continue to improve our services. Please take a few minutes to complete the written survey that you may receive in the mail after your visit with us. Thank you!             Your Updated Medication List - Protect others around you: Learn how to safely use, store and throw away your medicines at www.disposemymeds.org.          This list is accurate as of 7/9/18  9:01 AM.  Always use your most recent med list.                   Brand Name Dispense Instructions for use Diagnosis    PRENATAL VITAMIN PO      Take 1 tablet by mouth.

## 2018-07-10 ASSESSMENT — ANXIETY QUESTIONNAIRES: GAD7 TOTAL SCORE: 2

## 2018-07-10 ASSESSMENT — PATIENT HEALTH QUESTIONNAIRE - PHQ9: SUM OF ALL RESPONSES TO PHQ QUESTIONS 1-9: 1

## 2018-09-29 ENCOUNTER — TRANSFERRED RECORDS (OUTPATIENT)
Dept: HEALTH INFORMATION MANAGEMENT | Facility: CLINIC | Age: 38
End: 2018-09-29

## 2018-12-18 ENCOUNTER — OFFICE VISIT (OUTPATIENT)
Dept: FAMILY MEDICINE | Facility: CLINIC | Age: 38
End: 2018-12-18
Payer: COMMERCIAL

## 2018-12-18 VITALS
RESPIRATION RATE: 16 BRPM | SYSTOLIC BLOOD PRESSURE: 104 MMHG | OXYGEN SATURATION: 98 % | HEIGHT: 63 IN | BODY MASS INDEX: 26.12 KG/M2 | DIASTOLIC BLOOD PRESSURE: 72 MMHG | WEIGHT: 147.4 LBS | HEART RATE: 70 BPM | TEMPERATURE: 97.7 F

## 2018-12-18 DIAGNOSIS — Z83.3 FAMILY HISTORY OF DIABETES MELLITUS: ICD-10-CM

## 2018-12-18 DIAGNOSIS — Z00.00 ROUTINE GENERAL MEDICAL EXAMINATION AT A HEALTH CARE FACILITY: Primary | ICD-10-CM

## 2018-12-18 DIAGNOSIS — K42.9 UMBILICAL HERNIA WITHOUT OBSTRUCTION AND WITHOUT GANGRENE: ICD-10-CM

## 2018-12-18 LAB
ALBUMIN SERPL-MCNC: 4.1 G/DL (ref 3.4–5)
ALP SERPL-CCNC: 46 U/L (ref 40–150)
ALT SERPL W P-5'-P-CCNC: 25 U/L (ref 0–50)
ANION GAP SERPL CALCULATED.3IONS-SCNC: 6 MMOL/L (ref 3–14)
AST SERPL W P-5'-P-CCNC: 14 U/L (ref 0–45)
BILIRUB SERPL-MCNC: 0.4 MG/DL (ref 0.2–1.3)
BUN SERPL-MCNC: 12 MG/DL (ref 7–30)
CALCIUM SERPL-MCNC: 9.2 MG/DL (ref 8.5–10.1)
CHLORIDE SERPL-SCNC: 105 MMOL/L (ref 94–109)
CHOLEST SERPL-MCNC: 196 MG/DL
CO2 SERPL-SCNC: 27 MMOL/L (ref 20–32)
CREAT SERPL-MCNC: 0.66 MG/DL (ref 0.52–1.04)
ERYTHROCYTE [DISTWIDTH] IN BLOOD BY AUTOMATED COUNT: 11.5 % (ref 10–15)
GFR SERPL CREATININE-BSD FRML MDRD: >90 ML/MIN/1.7M2
GLUCOSE SERPL-MCNC: 86 MG/DL (ref 70–99)
HBA1C MFR BLD: 5.1 % (ref 0–5.6)
HCT VFR BLD AUTO: 41.7 % (ref 35–47)
HDLC SERPL-MCNC: 54 MG/DL
HGB BLD-MCNC: 13.5 G/DL (ref 11.7–15.7)
LDLC SERPL CALC-MCNC: 124 MG/DL
MCH RBC QN AUTO: 30.1 PG (ref 26.5–33)
MCHC RBC AUTO-ENTMCNC: 32.4 G/DL (ref 31.5–36.5)
MCV RBC AUTO: 93 FL (ref 78–100)
NONHDLC SERPL-MCNC: 142 MG/DL
PLATELET # BLD AUTO: 262 10E9/L (ref 150–450)
POTASSIUM SERPL-SCNC: 4.1 MMOL/L (ref 3.4–5.3)
PROT SERPL-MCNC: 7.6 G/DL (ref 6.8–8.8)
RBC # BLD AUTO: 4.48 10E12/L (ref 3.8–5.2)
SODIUM SERPL-SCNC: 138 MMOL/L (ref 133–144)
TRIGL SERPL-MCNC: 91 MG/DL
TSH SERPL DL<=0.005 MIU/L-ACNC: 2.18 MU/L (ref 0.4–4)
WBC # BLD AUTO: 7.2 10E9/L (ref 4–11)

## 2018-12-18 PROCEDURE — 80061 LIPID PANEL: CPT | Performed by: PHYSICIAN ASSISTANT

## 2018-12-18 PROCEDURE — 85027 COMPLETE CBC AUTOMATED: CPT | Performed by: PHYSICIAN ASSISTANT

## 2018-12-18 PROCEDURE — 80053 COMPREHEN METABOLIC PANEL: CPT | Performed by: PHYSICIAN ASSISTANT

## 2018-12-18 PROCEDURE — 99395 PREV VISIT EST AGE 18-39: CPT | Performed by: PHYSICIAN ASSISTANT

## 2018-12-18 PROCEDURE — 36415 COLL VENOUS BLD VENIPUNCTURE: CPT | Performed by: PHYSICIAN ASSISTANT

## 2018-12-18 PROCEDURE — 99213 OFFICE O/P EST LOW 20 MIN: CPT | Mod: 25 | Performed by: PHYSICIAN ASSISTANT

## 2018-12-18 PROCEDURE — 83036 HEMOGLOBIN GLYCOSYLATED A1C: CPT | Performed by: PHYSICIAN ASSISTANT

## 2018-12-18 PROCEDURE — 84443 ASSAY THYROID STIM HORMONE: CPT | Performed by: PHYSICIAN ASSISTANT

## 2018-12-18 ASSESSMENT — ENCOUNTER SYMPTOMS
HEMATOCHEZIA: 0
ABDOMINAL PAIN: 0
JOINT SWELLING: 0
DIZZINESS: 0
MYALGIAS: 0
FREQUENCY: 0
PARESTHESIAS: 0
ARTHRALGIAS: 0
NAUSEA: 0
HEADACHES: 0
HEARTBURN: 0
EYE PAIN: 0
PALPITATIONS: 0
CONSTIPATION: 0
DYSURIA: 0
COUGH: 0
CHILLS: 0
HEMATURIA: 0
BREAST MASS: 0
SHORTNESS OF BREATH: 0
NERVOUS/ANXIOUS: 0
DIARRHEA: 0
FEVER: 0
SORE THROAT: 0
WEAKNESS: 0

## 2018-12-18 ASSESSMENT — MIFFLIN-ST. JEOR: SCORE: 1326.69

## 2018-12-18 ASSESSMENT — ANXIETY QUESTIONNAIRES
3. WORRYING TOO MUCH ABOUT DIFFERENT THINGS: NOT AT ALL
1. FEELING NERVOUS, ANXIOUS, OR ON EDGE: SEVERAL DAYS
IF YOU CHECKED OFF ANY PROBLEMS ON THIS QUESTIONNAIRE, HOW DIFFICULT HAVE THESE PROBLEMS MADE IT FOR YOU TO DO YOUR WORK, TAKE CARE OF THINGS AT HOME, OR GET ALONG WITH OTHER PEOPLE: NOT DIFFICULT AT ALL
7. FEELING AFRAID AS IF SOMETHING AWFUL MIGHT HAPPEN: SEVERAL DAYS
GAD7 TOTAL SCORE: 2
5. BEING SO RESTLESS THAT IT IS HARD TO SIT STILL: NOT AT ALL
2. NOT BEING ABLE TO STOP OR CONTROL WORRYING: NOT AT ALL
6. BECOMING EASILY ANNOYED OR IRRITABLE: NOT AT ALL

## 2018-12-18 ASSESSMENT — PATIENT HEALTH QUESTIONNAIRE - PHQ9
SUM OF ALL RESPONSES TO PHQ QUESTIONS 1-9: 0
5. POOR APPETITE OR OVEREATING: NOT AT ALL

## 2018-12-18 NOTE — PROGRESS NOTES
SUBJECTIVE:   CC: Keisha Espinoza is an 38 year old woman who presents for preventive health visit.     Patient is fasting: Yes      Physical   Annual:     Getting at least 3 servings of Calcium per day:  Yes    Bi-annual eye exam:  Yes    Dental care twice a year:  Yes    Sleep apnea or symptoms of sleep apnea:  Excessive snoring    Diet:  Regular (no restrictions)    Frequency of exercise:  None    Taking medications regularly:  Yes    Medication side effects:  None    Additional concerns today:  Yes    PHQ-2 Total Score: 0      Today's PHQ-2 Score:   PHQ-2 ( 1999 Pfizer) 12/18/2018   Q1: Little interest or pleasure in doing things 0   Q2: Feeling down, depressed or hopeless 0   PHQ-2 Score 0   Q1: Little interest or pleasure in doing things Not at all   Q2: Feeling down, depressed or hopeless Not at all   PHQ-2 Score 0     Abuse: Current or Past(Physical, Sexual or Emotional)- No  Do you feel safe in your environment? Yes    Social History     Tobacco Use     Smoking status: Former Smoker     Years: 10.00     Smokeless tobacco: Never Used     Tobacco comment: quit 2004   Substance Use Topics     Alcohol use: Yes     Alcohol/week: 0.0 oz     Comment: social     Alcohol Use 12/18/2018   If you drink alcohol do you typically have greater than 3 drinks per day OR greater than 7 drinks per week? No   No flowsheet data found.    Reviewed orders with patient.  Reviewed health maintenance and updated orders accordingly - Yes  Patient Active Problem List   Diagnosis     CARDIOVASCULAR SCREENING; LDL GOAL LESS THAN 160     Short W[place]-normal QRS complex syndrome     History of tobacco abuse     Umbilical hernia without obstruction and without gangrene     REN (generalized anxiety disorder)     Past Surgical History:   Procedure Laterality Date     EXTRACTION(S) DENTAL         Social History     Tobacco Use     Smoking status: Former Smoker     Years: 10.00     Smokeless tobacco: Never Used     Tobacco comment: quit 2004    Substance Use Topics     Alcohol use: Yes     Alcohol/week: 0.0 oz     Comment: social     Family History   Problem Relation Age of Onset     Diabetes Mother      Hypertension Mother      Other Cancer Mother      Hypertension Father      Heart Defect Father         Afib     Prostate Cancer Father      Other Cancer Father      Cancer - colorectal Paternal Grandmother      Lung Cancer Maternal Grandfather      Melanoma Paternal Grandfather      Breast Cancer No family hx of          Current Outpatient Medications   Medication Sig Dispense Refill     Prenatal Vit-Fe Sulfate-FA (PRENATAL VITAMIN OR) Take 1 tablet by mouth.       Allergies   Allergen Reactions     Sulfa Drugs Rash       Mammogram not appropriate for this patient based on age.    Pertinent mammograms are reviewed under the imaging tab.  History of abnormal Pap smear: NO - age 30-65 PAP every 5 years with negative HPV co-testing recommended  PAP / HPV Latest Ref Rng & Units 12/2/2016 11/21/2016 5/30/2014   PAP - NIL UNSAT NIL   HPV 16 DNA NEG Negative Duplicate request(A) -   HPV 18 DNA NEG Negative Duplicate request(A) -   OTHER HR HPV NEG Negative Duplicate request(A) -     Reviewed and updated as needed this visit by clinical staff  Tobacco  Allergies  Meds  Med Hx  Surg Hx  Fam Hx  Soc Hx        Reviewed and updated as needed this visit by Provider            Review of Systems   Constitutional: Negative for chills and fever.   HENT: Negative for congestion, ear pain, hearing loss and sore throat.    Eyes: Negative for pain and visual disturbance.   Respiratory: Negative for cough and shortness of breath.    Cardiovascular: Negative for chest pain, palpitations and peripheral edema.   Gastrointestinal: Negative for abdominal pain, constipation, diarrhea, heartburn, hematochezia and nausea.   Breasts:  Negative for tenderness, breast mass and discharge.   Genitourinary: Negative for dysuria, frequency, genital sores, hematuria, pelvic pain,  "urgency, vaginal bleeding and vaginal discharge.   Musculoskeletal: Negative for arthralgias, joint swelling and myalgias.   Skin: Negative for rash.   Neurological: Negative for dizziness, weakness, headaches and paresthesias.   Psychiatric/Behavioral: Negative for mood changes. The patient is not nervous/anxious.       1. Hernia near umbilical region, previous PCP referred her to a surgeon but opted to not have it removed yet, wants to wait until Summer 2019  Has had it for about two years  Is currently feeling sensitive which started about 3-4 months ago  No fever, chills, nausea or vomiting  OBJECTIVE:   /72 (BP Location: Right arm, Patient Position: Chair, Cuff Size: Adult Regular)   Pulse 70   Temp 97.7  F (36.5  C) (Oral)   Resp 16   Ht 1.607 m (5' 3.25\")   Wt 66.9 kg (147 lb 6.4 oz)   LMP 12/04/2018 (Approximate)   SpO2 98%   Breastfeeding? Yes   BMI 25.90 kg/m    Physical Exam  GENERAL: healthy, alert and no distress  EYES: Eyes grossly normal to inspection, PERRL and conjunctivae and sclerae normal  HENT: ear canals and TM's normal, nose and mouth without ulcers or lesions  NECK: no adenopathy, no asymmetry, masses, or scars and thyroid normal to palpation  RESP: lungs clear to auscultation - no rales, rhonchi or wheezes  BREAST: normal without masses, tenderness or nipple discharge and no palpable axillary masses or adenopathy  CV: regular rate and rhythm, normal S1 S2, no S3 or S4, no murmur, click or rub, no peripheral edema and peripheral pulses strong  ABDOMEN: soft, nontender, without hepatosplenomegaly or masses, bowel sounds normal and hernia umbilical reducible   (female): normal female external genitalia, normal urethral meatus, vaginal mucosa pink, moist, well rugated, and normal cervix/adnexa/uterus without masses or discharge  MS: no gross musculoskeletal defects noted, no edema  SKIN: no suspicious lesions or rashes  NEURO: Normal strength and tone, mentation intact and " "speech normal  PSYCH: mentation appears normal, affect normal/bright    Diagnostic Test Results:  none     ASSESSMENT/PLAN:   1. Routine general medical examination at a health care facility  Labs updated today.  - Lipid panel reflex to direct LDL Fasting  - TSH with free T4 reflex  - CBC with platelets  - Comprehensive metabolic panel    2. Umbilical hernia without obstruction and without gangrene  Chronic issue, mildly sensitive to palpation.  Referral to general surgery for repair given.  - GENERAL SURG ADULT REFERRAL    3. Family history of diabetes mellitus  - Hemoglobin A1c    COUNSELING:  Reviewed preventive health counseling, as reflected in patient instructions    BP Readings from Last 1 Encounters:   12/18/18 104/72     Estimated body mass index is 25.9 kg/m  as calculated from the following:    Height as of this encounter: 1.607 m (5' 3.25\").    Weight as of this encounter: 66.9 kg (147 lb 6.4 oz).           reports that she has quit smoking. She quit after 10.00 years of use. she has never used smokeless tobacco.      Counseling Resources:  ATP IV Guidelines  Pooled Cohorts Equation Calculator  Breast Cancer Risk Calculator  FRAX Risk Assessment  ICSI Preventive Guidelines  Dietary Guidelines for Americans, 2010  anywayanyday's MyPlate  ASA Prophylaxis  Lung CA Screening    Grecia Gonzalez PA-C  [facility] [place]       "

## 2018-12-19 ASSESSMENT — ANXIETY QUESTIONNAIRES: GAD7 TOTAL SCORE: 2

## 2018-12-27 ENCOUNTER — TELEPHONE (OUTPATIENT)
Dept: SURGERY | Facility: CLINIC | Age: 38
End: 2018-12-27

## 2018-12-27 NOTE — TELEPHONE ENCOUNTER
Referral received from Dr Gonzalez for umbilical hernia.    Attempt #1:    Called patient at [phone].  No answer - left message for patient to return call to clinic at [phone].

## 2019-01-03 NOTE — TELEPHONE ENCOUNTER
Attempt #2:    Called patient at [phone].  No answer - left message for patient to return call to clinic at [phone].

## 2019-01-17 ENCOUNTER — TELEPHONE (OUTPATIENT)
Dept: SURGERY | Facility: CLINIC | Age: 39
End: 2019-01-17

## 2019-01-17 ENCOUNTER — OFFICE VISIT (OUTPATIENT)
Dept: SURGERY | Facility: CLINIC | Age: 39
End: 2019-01-17
Payer: COMMERCIAL

## 2019-01-17 VITALS
WEIGHT: 145 LBS | HEIGHT: 64 IN | HEART RATE: 63 BPM | OXYGEN SATURATION: 98 % | RESPIRATION RATE: 16 BRPM | DIASTOLIC BLOOD PRESSURE: 78 MMHG | SYSTOLIC BLOOD PRESSURE: 130 MMHG | BODY MASS INDEX: 24.75 KG/M2

## 2019-01-17 DIAGNOSIS — K42.9 UMBILICAL HERNIA WITHOUT OBSTRUCTION AND WITHOUT GANGRENE: Primary | ICD-10-CM

## 2019-01-17 PROCEDURE — 99203 OFFICE O/P NEW LOW 30 MIN: CPT | Performed by: SURGERY

## 2019-01-17 ASSESSMENT — MIFFLIN-ST. JEOR: SCORE: 1319.78

## 2019-01-17 NOTE — TELEPHONE ENCOUNTER
Type of surgery: UMBILICAL HERNIA REPAIR WITH MESH    Location of surgery: [facility]  Date and time of surgery: 3/21/2019 @ 7:30  AM   Surgeon: KATI VANEGAS MD    Pre-Op Appt Date: PATIENT TO SCHEDULE    Post-Op Appt Date: PATIENT TO SCHEDULE     Packet sent out: Yes PACKET AND SOAP GIVEN TO PATIENT    Pre-cert/Authorization completed:  Not Applicable  Date: 1/17/2019      UMBILICAL HERNIA REPAIR WITH MESH    GENERAL PT INST TO HAVE H&P WITH DR KULKARNI 60 MIN REQ PA ASSIST DJM NMS

## 2019-01-17 NOTE — LETTER
2019       Re: Keisha Espinoza - 1980    Keisha is a 38 year old White female who presents for hernia evaluation. The patient has noticed a bulge. Pain has been present.  Symptoms began 2 years ago.  She notes that she developed a small umbilical hernia with her first pregnancy and the more clear hernia with her second pregnancy.  She is unsure whether she is going to be having additional children.  Symptoms are described as aching and pressure  located in the periumbilical region.  Associated with this  is none.  Pt has not had previous ABD surgery including none.  Patient does not report that increased activity/lifting causes pain. Employment does not require lifting.     Constipation No  Dysuria No  Cough No  Smoking No  Diabetes No     Pt's chart has been reviewed for PMH, PSH, allergies, medications and social history.     ROS:  Pulm:  No shortness of breath, dyspnea on exertion, cough, or hemoptysis  CV:  negative  ABD:  See chief complaint  :  negative     Physical exam:  Patient able to get up on table without difficulty.  Head eyes, nose and mouth within normal limits.  Sclera are clear  No supraclavicular or cervical adenopathy noted.  Neck shows no gross mass  Respirations are regular and non labored  Abdomen is abdomen is soft without significant tenderness, masses, organomegaly or guarding  bowel sounds are positive and no caput medusa noted.  Hernia is present at the umbilicus.  The defect in the fascia appears quite small, 5-6 mm     Imaging  CT No        Assesment: umbilical hernia, increasingly symptomatic     Plan: Discussed observation, external support, possible progression, incarceration and strangulation signs and symptoms and need for immediate treatment if they develop.  Discussed surgery in detail, including risk, benefits, complications, incision/cosmetics, mesh, infection (possibly requiring removal of the mesh), chronic pain, involvement of inta-abdominal organs,  lifting and activity limits after surgery.  We discussed no anesthetic or local anesthetic with sedatives.  Gave literature to review. Will schedule surgery in near future       René Carrasquillo MD

## 2019-01-17 NOTE — PROGRESS NOTES
Keisha is a 38 year old White female who presents for hernia evaluation. The patient has noticed a bulge. Pain has been present.  Symptoms began 2 years ago.  She notes that she developed a small umbilical hernia with her first pregnancy and the more clear hernia with her second pregnancy.  She is unsure whether she is going to be having additional children.  Symptoms are described as aching and pressure  located in the periumbilical region.  Associated with this  is none.  Pt has not had previous ABD surgery including none.  Patient does not report that increased activity/lifting causes pain. Employment does not require lifting.    Constipation No  Dysuria No  Cough No  Smoking No  Diabetes No    Pt's chart has been reviewed for PMH, PSH, allergies, medications and social history.    ROS:  Pulm:  No shortness of breath, dyspnea on exertion, cough, or hemoptysis  CV:  negative  ABD:  See chief complaint  :  negative    Physical exam:  Patient able to get up on table without difficulty.  Head eyes, nose and mouth within normal limits.  Sclera are clear  No supraclavicular or cervical adenopathy noted.  Neck shows no gross mass  Respirations are regular and non labored  Abdomen is abdomen is soft without significant tenderness, masses, organomegaly or guarding  bowel sounds are positive and no caput medusa noted.  Hernia is present at the umbilicus.  The defect in the fascia appears quite small, 5-6 mm    Imaging  CT No      Assesment: umbilical hernia, increasingly symptomatic    Plan: Discussed observation, external support, possible progression, incarceration and strangulation signs and symptoms and need for immediate treatment if they develop.  Discussed surgery in detail, including risk, benefits, complications, incision/cosmetics, mesh, infection (possibly requiring removal of the mesh), chronic pain, involvement of inta-abdominal organs, lifting and activity limits after surgery.  We discussed no anesthetic  or local anesthetic with sedatives.  Gave literature to review. Will schedule surgery in near future  Time spent with the patient with greater that 50% of the time in discussion was 20-30 minutes.    René Carrasquillo MD  1/17/2019 2:01 PM    Please route or send letter to:  Primary Care Provider (PCP) and Include Progress Note

## 2019-03-11 ENCOUNTER — OFFICE VISIT (OUTPATIENT)
Dept: FAMILY MEDICINE | Facility: CLINIC | Age: 39
End: 2019-03-11
Payer: COMMERCIAL

## 2019-03-11 VITALS
HEIGHT: 64 IN | WEIGHT: 150.1 LBS | RESPIRATION RATE: 16 BRPM | DIASTOLIC BLOOD PRESSURE: 62 MMHG | HEART RATE: 66 BPM | BODY MASS INDEX: 25.62 KG/M2 | OXYGEN SATURATION: 100 % | TEMPERATURE: 98.1 F | SYSTOLIC BLOOD PRESSURE: 108 MMHG

## 2019-03-11 DIAGNOSIS — F41.1 GAD (GENERALIZED ANXIETY DISORDER): ICD-10-CM

## 2019-03-11 DIAGNOSIS — L91.8 SKIN TAG: ICD-10-CM

## 2019-03-11 DIAGNOSIS — K42.9 UMBILICAL HERNIA WITHOUT OBSTRUCTION AND WITHOUT GANGRENE: ICD-10-CM

## 2019-03-11 DIAGNOSIS — Z01.818 PREOP GENERAL PHYSICAL EXAM: Primary | ICD-10-CM

## 2019-03-11 PROCEDURE — 99214 OFFICE O/P EST MOD 30 MIN: CPT | Performed by: PHYSICIAN ASSISTANT

## 2019-03-11 ASSESSMENT — MIFFLIN-ST. JEOR: SCORE: 1342.91

## 2019-03-11 NOTE — PATIENT INSTRUCTIONS
[facility] [place] [phone]       No NSAIDs, Aspirin, or Steroids 7 days prior to surgery, Tylenol is okay for pain.    NPO (Nothing to eat or drink) after midnight.    Before Your Surgery      Call your surgeon if there is any change in your health. This includes signs of a cold or flu (such as a sore throat, runny nose, cough, rash or fever).    Do not smoke, drink alcohol or take over the counter medicine (unless your surgeon or primary care doctor tells you to) for the 24 hours before and after surgery.    If you take prescribed drugs: Follow your doctor s orders about which medicines to take and which to stop until after surgery.    Eating and drinking prior to surgery: follow the instructions from your surgeon    Take a shower or bath the night before surgery. Use the soap your surgeon gave you to gently clean your skin. If you do not have soap from your surgeon, use your regular soap. Do not shave or scrub the surgery site.  Wear clean pajamas and have clean sheets on your bed.

## 2019-03-11 NOTE — PROGRESS NOTES
[facility]  [address] [id number] [phone]  Dept: [phone]    PRE-OP EVALUATION:  Today's date: 3/11/2019    Keisha Espinoza (: 1980) presents for pre-operative evaluation assessment as requested by Dr. René Carrasquillo.  She requires evaluation and anesthesia risk assessment prior to undergoing surgery/procedure for treatment of:  umbilical hernia repair with mesh.    Primary Physician: Eric Wilson  Type of Anesthesia Anticipated: Conscious Sedation    Patient has a Health Care Directive or Living Will:  NO    Preop Questions 3/11/2019   Who is doing your surgery? rené levine   What are you having done? umbilical hernia   Date of Surgery/Procedure: 2019   Facility or Hospital where procedure/surgery will be performed: [facility]   1.  Do you have a history of Heart attack, stroke, stent, coronary bypass surgery, or other heart surgery? No   2.  Do you ever have any pain or discomfort in your chest? No   3.  Do you have a history of  Heart Failure? No   4.   Are you troubled by shortness of breath when:  walking on a level surface, or up a slight hill, or at night? No   5.  Do you currently have a cold, bronchitis or other respiratory infection? No   6.  Do you have a cough, shortness of breath, or wheezing? No   7.  Do you sometimes get pains in the calves of your legs when you walk? No   8. Do you or anyone in your family have previous history of blood clots? YES - sister had blood clot (thought to be from OCP use)   9.  Do you or does anyone in your family have a serious bleeding problem such as prolonged bleeding following surgeries or cuts? No   10. Have you ever had problems with anemia or been told to take iron pills? No   11. Have you had any abnormal blood loss such as black, tarry or bloody stools, or abnormal vaginal bleeding? No   12. Have you ever had a blood transfusion? No   13. Have you or any of your  relatives ever had problems with anesthesia? No   14. Do you have sleep apnea, excessive snoring or daytime drowsiness? UNKNOWN - patient is aware of snoring   15. Do you have any prosthetic heart valves? No   16. Do you have prosthetic joints? No   17. Is there any chance that you may be pregnant? No         HPI:     HPI related to upcoming procedure: has hx of umbilical hernia, just wants to have it repaired and not worsen      See problem list for active medical problems.  Problems all longstanding and stable, except as noted/documented.  See ROS for pertinent symptoms related to these conditions.                                                                                                                                                          .    MEDICAL HISTORY:     Patient Active Problem List    Diagnosis Date Noted     REN (generalized anxiety disorder) 10/24/2017     Priority: Medium     Umbilical hernia without obstruction and without gangrene 05/10/2017     Priority: Medium     History of tobacco abuse      Priority: Medium     CARDIOVASCULAR SCREENING; LDL GOAL LESS THAN 160 04/17/2012     Priority: Medium     Short [place]-normal QRS complex syndrome 04/17/2012     Priority: Medium      Past Medical History:   Diagnosis Date     History of tobacco abuse      Palpitations      Sinus bradycardia      Past Surgical History:   Procedure Laterality Date     EXTRACTION(S) DENTAL       Current Outpatient Medications   Medication Sig Dispense Refill     Prenatal Vit-Fe Sulfate-FA (PRENATAL VITAMIN OR) Take 1 tablet by mouth.       OTC products: no recent use of OTC ASA, NSAIDS or Steroids    Allergies   Allergen Reactions     Sulfa Drugs Rash      Latex Allergy: NO    Social History     Tobacco Use     Smoking status: Former Smoker     Years: 10.00     Smokeless tobacco: Never Used     Tobacco comment: quit 2004   Substance Use Topics     Alcohol use: Yes     Alcohol/week: 0.0 oz     Comment: social     History  "  Drug Use No       REVIEW OF SYSTEMS:   CONSTITUTIONAL: NEGATIVE for fever, chills, change in weight  ENT/MOUTH: NEGATIVE for ear, mouth and throat problems  RESP: NEGATIVE for significant cough or SOB  CV: NEGATIVE for chest pain, palpitations or peripheral edema    EXAM:   /62 (BP Location: Right arm, Patient Position: Chair, Cuff Size: Adult Regular)   Pulse 66   Temp 98.1  F (36.7  C) (Oral)   Resp 16   Ht 1.613 m (5' 3.5\")   Wt 68.1 kg (150 lb 1.6 oz)   LMP 02/25/2019 (Approximate)   SpO2 100%   Breastfeeding? Yes   BMI 26.17 kg/m    GENERAL APPEARANCE: healthy, alert and no distress  HENT: ear canals and TM's normal and nose and mouth without ulcers or lesions  RESP: lungs clear to auscultation - no rales, rhonchi or wheezes  CV: regular rate and rhythm, normal S1 S2, no S3 or S4 and no murmur, click or rub   ABDOMEN: soft, nontender, no HSM or masses and bowel sounds normal  NEURO: Normal strength and tone, sensory exam grossly normal, mentation intact and speech normal  SKIN; scab near right eye with small tag    DIAGNOSTICS:   No labs or EKG required for low risk surgery (cataract, skin procedure, breast biopsy, etc)    Recent Labs   Lab Test 12/18/18  0823 07/11/17  0812   HGB 13.5 13.8    220    141   POTASSIUM 4.1 3.9   CR 0.66 0.72   A1C 5.1 4.9        IMPRESSION:   Reason for surgery/procedure: umbilical hernia repair with mesh.    Diagnosis/reason for consult: umbilical hernia    The proposed surgical procedure is considered INTERMEDIATE risk.    REVISED CARDIAC RISK INDEX  The patient has the following serious cardiovascular risks for perioperative complications such as (MI, PE, VFib and 3  AV Block):  No serious cardiac risks  INTERPRETATION: 0 risks: Class I (very low risk - 0.4% complication rate)    The patient has the following additional risks for perioperative complications:  No identified additional risks      ICD-10-CM    1. Preop general physical exam Z01.818  "   2. Umbilical hernia without obstruction and without gangrene K42.9    3. REN (generalized anxiety disorder) F41.1    4. Skin tag L91.8 DERMATOLOGY REFERRAL       RECOMMENDATIONS:     --Consult hospital rounder / IM to assist post-op medical management  --Patient is on no chronic medications  --Patient knows where to be and when to be for surgery, knows when to be NPO.  --Patient advised no ASA, NSAIDs or Steroids 7 days prior to surgery.    APPROVAL GIVEN to proceed with proposed procedure, without further diagnostic evaluation       Signed Electronically by: Grecia Gonzalez PA-C    Copy of this evaluation report is provided to requesting physician.    [place] Preop Guidelines    Revised Cardiac Risk Index

## 2019-03-21 ENCOUNTER — ANESTHESIA (OUTPATIENT)
Dept: SURGERY | Facility: CLINIC | Age: 39
End: 2019-03-21
Payer: COMMERCIAL

## 2019-03-21 ENCOUNTER — ANESTHESIA EVENT (OUTPATIENT)
Dept: SURGERY | Facility: CLINIC | Age: 39
End: 2019-03-21
Payer: COMMERCIAL

## 2019-03-21 ENCOUNTER — APPOINTMENT (OUTPATIENT)
Dept: SURGERY | Facility: PHYSICIAN GROUP | Age: 39
End: 2019-03-21
Payer: COMMERCIAL

## 2019-03-21 ENCOUNTER — HOSPITAL ENCOUNTER (OUTPATIENT)
Facility: CLINIC | Age: 39
Discharge: HOME | End: 2019-03-21
Attending: SURGERY | Admitting: SURGERY
Payer: COMMERCIAL

## 2019-03-21 VITALS
WEIGHT: 144 LBS | BODY MASS INDEX: 24.59 KG/M2 | OXYGEN SATURATION: 100 % | SYSTOLIC BLOOD PRESSURE: 118 MMHG | TEMPERATURE: 98.9 F | RESPIRATION RATE: 14 BRPM | HEIGHT: 64 IN | DIASTOLIC BLOOD PRESSURE: 75 MMHG

## 2019-03-21 DIAGNOSIS — K42.9 UMBILICAL HERNIA WITHOUT OBSTRUCTION AND WITHOUT GANGRENE: Primary | ICD-10-CM

## 2019-03-21 LAB — HCG UR QL: NEGATIVE

## 2019-03-21 PROCEDURE — 71000027 ZZH RECOVERY PHASE 2 EACH 15 MINS: Performed by: SURGERY

## 2019-03-21 PROCEDURE — 25800030 ZZH RX IP 258 OP 636: Performed by: ANESTHESIOLOGY

## 2019-03-21 PROCEDURE — 25000125 ZZHC RX 250: Performed by: NURSE ANESTHETIST, CERTIFIED REGISTERED

## 2019-03-21 PROCEDURE — 25000128 H RX IP 250 OP 636: Performed by: ANESTHESIOLOGY

## 2019-03-21 PROCEDURE — 36000052 ZZH SURGERY LEVEL 2 EA 15 ADDTL MIN: Performed by: SURGERY

## 2019-03-21 PROCEDURE — 27210794 ZZH OR GENERAL SUPPLY STERILE: Performed by: SURGERY

## 2019-03-21 PROCEDURE — 25000125 ZZHC RX 250: Performed by: ANESTHESIOLOGY

## 2019-03-21 PROCEDURE — 40000306 ZZH STATISTIC PRE PROC ASSESS II: Performed by: SURGERY

## 2019-03-21 PROCEDURE — 49561 ZZHC REPAIR INITIAL INCISIONAL HERNIA; INCARCERATED OR STRANGULATED: CPT | Mod: AS | Performed by: PHYSICIAN ASSISTANT

## 2019-03-21 PROCEDURE — 25000128 H RX IP 250 OP 636: Performed by: NURSE ANESTHETIST, CERTIFIED REGISTERED

## 2019-03-21 PROCEDURE — C1781 MESH (IMPLANTABLE): HCPCS | Performed by: SURGERY

## 2019-03-21 PROCEDURE — 49568 ZZHC REPAIR HERNIA WITH MESH: CPT | Mod: AS | Performed by: PHYSICIAN ASSISTANT

## 2019-03-21 PROCEDURE — 25000125 ZZHC RX 250: Performed by: SURGERY

## 2019-03-21 PROCEDURE — 37000008 ZZH ANESTHESIA TECHNICAL FEE, 1ST 30 MIN: Performed by: SURGERY

## 2019-03-21 PROCEDURE — 49561 ZZHC REPAIR INITIAL INCISIONAL HERNIA; INCARCERATED OR STRANGULATED: CPT | Performed by: SURGERY

## 2019-03-21 PROCEDURE — 37000009 ZZH ANESTHESIA TECHNICAL FEE, EACH ADDTL 15 MIN: Performed by: SURGERY

## 2019-03-21 PROCEDURE — 25000128 H RX IP 250 OP 636: Performed by: SURGERY

## 2019-03-21 PROCEDURE — 36000050 ZZH SURGERY LEVEL 2 1ST 30 MIN: Performed by: SURGERY

## 2019-03-21 PROCEDURE — 81025 URINE PREGNANCY TEST: CPT | Performed by: ANESTHESIOLOGY

## 2019-03-21 PROCEDURE — 49568 ZZHC REPAIR HERNIA WITH MESH: CPT | Performed by: SURGERY

## 2019-03-21 DEVICE — MESH VENTRALEX HERNIA 1.7" CIRCLE SM W/STRAP 5950007: Type: IMPLANTABLE DEVICE | Site: UMBILICAL | Status: FUNCTIONAL

## 2019-03-21 RX ORDER — NALOXONE HYDROCHLORIDE 0.4 MG/ML
.1-.4 INJECTION, SOLUTION INTRAMUSCULAR; INTRAVENOUS; SUBCUTANEOUS
Status: DISCONTINUED | OUTPATIENT
Start: 2019-03-21 | End: 2019-03-21 | Stop reason: HOSPADM

## 2019-03-21 RX ORDER — CEFAZOLIN SODIUM 1 G/3ML
1 INJECTION, POWDER, FOR SOLUTION INTRAMUSCULAR; INTRAVENOUS SEE ADMIN INSTRUCTIONS
Status: DISCONTINUED | OUTPATIENT
Start: 2019-03-21 | End: 2019-03-21 | Stop reason: HOSPADM

## 2019-03-21 RX ORDER — ONDANSETRON 2 MG/ML
4 INJECTION INTRAMUSCULAR; INTRAVENOUS EVERY 30 MIN PRN
Status: DISCONTINUED | OUTPATIENT
Start: 2019-03-21 | End: 2019-03-21 | Stop reason: HOSPADM

## 2019-03-21 RX ORDER — LABETALOL HYDROCHLORIDE 5 MG/ML
10 INJECTION, SOLUTION INTRAVENOUS
Status: DISCONTINUED | OUTPATIENT
Start: 2019-03-21 | End: 2019-03-21 | Stop reason: HOSPADM

## 2019-03-21 RX ORDER — SODIUM CHLORIDE, SODIUM LACTATE, POTASSIUM CHLORIDE, CALCIUM CHLORIDE 600; 310; 30; 20 MG/100ML; MG/100ML; MG/100ML; MG/100ML
INJECTION, SOLUTION INTRAVENOUS CONTINUOUS
Status: DISCONTINUED | OUTPATIENT
Start: 2019-03-21 | End: 2019-03-21 | Stop reason: HOSPADM

## 2019-03-21 RX ORDER — FENTANYL CITRATE 50 UG/ML
INJECTION, SOLUTION INTRAMUSCULAR; INTRAVENOUS PRN
Status: DISCONTINUED | OUTPATIENT
Start: 2019-03-21 | End: 2019-03-21

## 2019-03-21 RX ORDER — ONDANSETRON 2 MG/ML
INJECTION INTRAMUSCULAR; INTRAVENOUS PRN
Status: DISCONTINUED | OUTPATIENT
Start: 2019-03-21 | End: 2019-03-21

## 2019-03-21 RX ORDER — LIDOCAINE 40 MG/G
CREAM TOPICAL
Status: DISCONTINUED | OUTPATIENT
Start: 2019-03-21 | End: 2019-03-21 | Stop reason: HOSPADM

## 2019-03-21 RX ORDER — CEFAZOLIN SODIUM 2 G/100ML
2 INJECTION, SOLUTION INTRAVENOUS
Status: COMPLETED | OUTPATIENT
Start: 2019-03-21 | End: 2019-03-21

## 2019-03-21 RX ORDER — MEPERIDINE HYDROCHLORIDE 25 MG/ML
12.5 INJECTION INTRAMUSCULAR; INTRAVENOUS; SUBCUTANEOUS
Status: DISCONTINUED | OUTPATIENT
Start: 2019-03-21 | End: 2019-03-21 | Stop reason: HOSPADM

## 2019-03-21 RX ORDER — ONDANSETRON 4 MG/1
4 TABLET, ORALLY DISINTEGRATING ORAL EVERY 30 MIN PRN
Status: DISCONTINUED | OUTPATIENT
Start: 2019-03-21 | End: 2019-03-21 | Stop reason: HOSPADM

## 2019-03-21 RX ORDER — HYDROMORPHONE HYDROCHLORIDE 1 MG/ML
.3-.5 INJECTION, SOLUTION INTRAMUSCULAR; INTRAVENOUS; SUBCUTANEOUS EVERY 10 MIN PRN
Status: DISCONTINUED | OUTPATIENT
Start: 2019-03-21 | End: 2019-03-21 | Stop reason: HOSPADM

## 2019-03-21 RX ORDER — FENTANYL CITRATE 50 UG/ML
25-50 INJECTION, SOLUTION INTRAMUSCULAR; INTRAVENOUS
Status: DISCONTINUED | OUTPATIENT
Start: 2019-03-21 | End: 2019-03-21 | Stop reason: HOSPADM

## 2019-03-21 RX ORDER — PROPOFOL 10 MG/ML
INJECTION, EMULSION INTRAVENOUS CONTINUOUS PRN
Status: DISCONTINUED | OUTPATIENT
Start: 2019-03-21 | End: 2019-03-21

## 2019-03-21 RX ORDER — OXYCODONE HYDROCHLORIDE 5 MG/1
5 TABLET ORAL
Status: DISCONTINUED | OUTPATIENT
Start: 2019-03-21 | End: 2019-03-21 | Stop reason: HOSPADM

## 2019-03-21 RX ORDER — OXYCODONE HYDROCHLORIDE 5 MG/1
5-10 TABLET ORAL EVERY 4 HOURS PRN
Qty: 10 TABLET | Refills: 0 | Status: SHIPPED | OUTPATIENT
Start: 2019-03-21 | End: 2019-04-08

## 2019-03-21 RX ORDER — KETOROLAC TROMETHAMINE 30 MG/ML
INJECTION, SOLUTION INTRAMUSCULAR; INTRAVENOUS PRN
Status: DISCONTINUED | OUTPATIENT
Start: 2019-03-21 | End: 2019-03-21

## 2019-03-21 RX ORDER — MAGNESIUM HYDROXIDE 1200 MG/15ML
LIQUID ORAL PRN
Status: DISCONTINUED | OUTPATIENT
Start: 2019-03-21 | End: 2019-03-21 | Stop reason: HOSPADM

## 2019-03-21 RX ORDER — DEXAMETHASONE SODIUM PHOSPHATE 4 MG/ML
INJECTION, SOLUTION INTRA-ARTICULAR; INTRALESIONAL; INTRAMUSCULAR; INTRAVENOUS; SOFT TISSUE PRN
Status: DISCONTINUED | OUTPATIENT
Start: 2019-03-21 | End: 2019-03-21

## 2019-03-21 RX ORDER — PROPOFOL 10 MG/ML
INJECTION, EMULSION INTRAVENOUS PRN
Status: DISCONTINUED | OUTPATIENT
Start: 2019-03-21 | End: 2019-03-21

## 2019-03-21 RX ORDER — GLYCOPYRROLATE 0.2 MG/ML
INJECTION, SOLUTION INTRAMUSCULAR; INTRAVENOUS PRN
Status: DISCONTINUED | OUTPATIENT
Start: 2019-03-21 | End: 2019-03-21

## 2019-03-21 RX ORDER — GLYCINE 1.5 G/100ML
SOLUTION IRRIGATION PRN
Status: DISCONTINUED | OUTPATIENT
Start: 2019-03-21 | End: 2019-03-21 | Stop reason: HOSPADM

## 2019-03-21 RX ORDER — HYDRALAZINE HYDROCHLORIDE 20 MG/ML
2.5-5 INJECTION INTRAMUSCULAR; INTRAVENOUS EVERY 10 MIN PRN
Status: DISCONTINUED | OUTPATIENT
Start: 2019-03-21 | End: 2019-03-21 | Stop reason: HOSPADM

## 2019-03-21 RX ADMIN — GLYCOPYRROLATE 0.2 MG: 0.2 INJECTION, SOLUTION INTRAMUSCULAR; INTRAVENOUS at 07:37

## 2019-03-21 RX ADMIN — MIDAZOLAM 2 MG: 1 INJECTION INTRAMUSCULAR; INTRAVENOUS at 07:30

## 2019-03-21 RX ADMIN — SODIUM CHLORIDE, POTASSIUM CHLORIDE, SODIUM LACTATE AND CALCIUM CHLORIDE: 600; 310; 30; 20 INJECTION, SOLUTION INTRAVENOUS at 07:12

## 2019-03-21 RX ADMIN — FENTANYL CITRATE 50 MCG: 50 INJECTION INTRAMUSCULAR; INTRAVENOUS at 08:37

## 2019-03-21 RX ADMIN — CEFAZOLIN SODIUM 2 G: 2 INJECTION, SOLUTION INTRAVENOUS at 07:30

## 2019-03-21 RX ADMIN — FENTANYL CITRATE 50 MCG: 50 INJECTION, SOLUTION INTRAMUSCULAR; INTRAVENOUS at 07:48

## 2019-03-21 RX ADMIN — PROPOFOL 150 MCG/KG/MIN: 10 INJECTION, EMULSION INTRAVENOUS at 07:37

## 2019-03-21 RX ADMIN — KETOROLAC TROMETHAMINE 30 MG: 30 INJECTION, SOLUTION INTRAMUSCULAR at 08:06

## 2019-03-21 RX ADMIN — PROPOFOL 40 MG: 10 INJECTION, EMULSION INTRAVENOUS at 07:40

## 2019-03-21 RX ADMIN — FENTANYL CITRATE 50 MCG: 50 INJECTION, SOLUTION INTRAMUSCULAR; INTRAVENOUS at 08:08

## 2019-03-21 RX ADMIN — DEXAMETHASONE SODIUM PHOSPHATE 4 MG: 4 INJECTION, SOLUTION INTRA-ARTICULAR; INTRALESIONAL; INTRAMUSCULAR; INTRAVENOUS; SOFT TISSUE at 07:49

## 2019-03-21 RX ADMIN — FENTANYL CITRATE 100 MCG: 50 INJECTION, SOLUTION INTRAMUSCULAR; INTRAVENOUS at 07:39

## 2019-03-21 RX ADMIN — ONDANSETRON 4 MG: 2 INJECTION INTRAMUSCULAR; INTRAVENOUS at 07:30

## 2019-03-21 RX ADMIN — LIDOCAINE HYDROCHLORIDE 80 MG: 10 INJECTION, SOLUTION EPIDURAL; INFILTRATION; INTRACAUDAL; PERINEURAL at 07:37

## 2019-03-21 ASSESSMENT — MIFFLIN-ST. JEOR
SCORE: 1315.31
SCORE: 1315.31

## 2019-03-21 NOTE — OP NOTE
General Surgery Brief Operative Note    Pre-operative diagnosis:  Umbilical hernia   Post-operative diagnosis:  Ventral hernia (3 defects)   Procedure:  Ventral hernia repair with preperitoneal mesh   Surgeon: René Carrasquillo MD   Assistant(s): Ingrid Robertson PA-C   Anesthesia: Local with MAC    Estimated blood loss: 5 cc's   Drains placed: None   Complications:  None   Findings:   Incarcerated omentum   Specimens: * No specimens in log *    Indications: 38-year-old female developed a hernia at her umbilicus during pregnancy.  This worsened with a subsequent pregnancy.  She now requests repair after discussion of the procedure, risks, benefits, complications, use of mesh, risk of mesh infection and potential need to remove the mesh if it would become infected.  We discussed postop activity limitations and recurrence risk.  She seems understand all these issues quite well and wishes to proceed with surgery.    Description: Patient is brought to the operating room placed supine on the table in the central abdomen is prepped and draped in a sterile manner.  Local anesthetic is placed into the skin and subcutaneous tissues around the superior umbilical rim.  An incision is made here and extended down to the hernia which is then mobilized carefully from the undersurface of the thinned umbilical skin.  The dissection is carried out down to the fascia and the hernia is inverted.  There appears to be some fatty tissue within the sac does not actually reduce however so the peritoneum is incised, the fatty tissue is mobilized by dividing a few small adhesions and reduced.  We then developed the preperitoneal space to accept a preperitoneal mesh.  In doing so, 2 additional hernia defects were identified and reduced from within.  With a preperitoneal dissection completed, the original peritoneal defect was closed with 3-0 Vicryl suture.  The mesh and surgical site were soaked with irricept and rinsed.  The mesh was flatly  deployed in the preperitoneal space.  Fascial defect is then closed with heavy PDS sutures, incorporating the mesh into the closure.  Prior to finishing closure, additional Marcaine was instilled into the operative site for postop pain relief.  Subcutaneous tissues were closed with 3-0 Vicryl and the umbilicus was also re-anchored with 3-0 Vicryl.  Skin was closed with subcuticular Vicryl followed by Dermabond adhesive and an abdominal binder.  She is then returned to the recovery room in excellent condition with all sponge needle and instrument counts correct, having tolerated the procedure well.  René Carrasquillo MD

## 2019-03-21 NOTE — ANESTHESIA PREPROCEDURE EVALUATION
Anesthesia Pre-Procedure Evaluation    Patient: Keisha Espinoza   MRN: [id number] : 1980          Preoperative Diagnosis: Umbilical hernia    Procedure(s):  umbilical hernia repair with mesh    Past Medical History:   Diagnosis Date     History of tobacco abuse      Palpitations      Sinus bradycardia      Past Surgical History:   Procedure Laterality Date     HEAD & NECK SURGERY       Anesthesia Evaluation     . Pt has had prior anesthetic.     No history of anesthetic complications          ROS/MED HX    ENT/Pulmonary:  - neg pulmonary ROS     Neurologic:  - neg neurologic ROS     Cardiovascular:  - neg cardiovascular ROS       METS/Exercise Tolerance:     Hematologic:  - neg hematologic  ROS       Musculoskeletal:  - neg musculoskeletal ROS       GI/Hepatic:  - neg GI/hepatic ROS       Renal/Genitourinary:  - ROS Renal section negative       Endo:         Psychiatric:     (+) psychiatric history       Infectious Disease:  - neg infectious disease ROS       Malignancy:      - no malignancy   Other:                          Physical Exam  Normal systems: cardiovascular and pulmonary    Airway   Mallampati: II  TM distance: >3 FB  Neck ROM: full    Dental     Cardiovascular       Pulmonary             Lab Results   Component Value Date    WBC 7.2 2018    HGB 13.5 2018    HCT 41.7 2018     2018    SED 6 2015     2018    POTASSIUM 4.1 2018    CHLORIDE 105 2018    CO2 27 2018    BUN 12 2018    CR 0.66 2018    GLC 86 2018    THOMAS 9.2 2018    MAG 1.8 2015    ALBUMIN 4.1 2018    PROTTOTAL 7.6 2018    ALT 25 2018    AST 14 2018    ALKPHOS 46 2018    BILITOTAL 0.4 2018    LIPASE 102 2015    FIBR 576 (H) 2014    TSH 2.18 2018    HCG Negative 2019    HCGS Positive (A) 2013       Preop Vitals  BP Readings from Last 3 Encounters:   19 119/70   19  "108/62   01/17/19 130/78    Pulse Readings from Last 3 Encounters:   03/11/19 66   01/17/19 63   12/18/18 70      Resp Readings from Last 3 Encounters:   03/21/19 15   03/11/19 16   01/17/19 16    SpO2 Readings from Last 3 Encounters:   03/21/19 100%   03/11/19 100%   01/17/19 98%      Temp Readings from Last 1 Encounters:   03/21/19 99.4  F (37.4  C) (Temporal)    Ht Readings from Last 1 Encounters:   03/21/19 1.613 m (5' 3.5\")      Wt Readings from Last 1 Encounters:   03/21/19 65.3 kg (144 lb)    Estimated body mass index is 25.11 kg/m  as calculated from the following:    Height as of this encounter: 1.613 m (5' 3.5\").    Weight as of this encounter: 65.3 kg (144 lb).       Anesthesia Plan      History & Physical Review  History and physical reviewed and following examination; no interval change.    ASA Status:  2 .    NPO Status:  > 8 hours    Plan for MAC Reason for MAC:  Deep or markedly invasive procedure (G8)    MAC with GA as backup plan PRN.      Postoperative Care  Postoperative pain management:  IV analgesics.      Consents  Anesthetic plan, risks, benefits and alternatives discussed with:  Patient.  Use of blood products discussed: Yes.   Use of blood products discussed with Patient.  Consented to blood products.  .                 Blaise Salinas MD                    .  "

## 2019-03-21 NOTE — ANESTHESIA POSTPROCEDURE EVALUATION
Patient: Keisha Espinoza    Procedure(s):  ventral hernia repair with mesh    Diagnosis:Umbilical hernia  Diagnosis Additional Information: Umbilical hernia     Anesthesia Type:  MAC    Note:  Anesthesia Post Evaluation    Patient location during evaluation: PACU  Patient participation: Able to fully participate in evaluation  Level of consciousness: awake  Pain management: adequate  Airway patency: patent  Cardiovascular status: acceptable  Respiratory status: acceptable  Hydration status: acceptable  PONV: controlled     Anesthetic complications: None          Last vitals:  Vitals:    03/21/19 0900 03/21/19 0915 03/21/19 1020   BP: 123/64 113/72 118/75   Resp: 16 16 14   Temp:   98.9  F (37.2  C)   SpO2: 96% 98% 100%         Electronically Signed By: Jean-Paul Feng DO  March 21, 2019  12:06 PM

## 2019-03-21 NOTE — ANESTHESIA POSTPROCEDURE EVALUATION
Patient: Keisha Espinoza    Procedure(s):  ventral hernia repair with mesh    Diagnosis:Umbilical hernia  Diagnosis Additional Information: Umbilical hernia     Anesthesia Type:  MAC    Note:  Anesthesia Post Evaluation    Last vitals:  Vitals:    03/21/19 0900 03/21/19 0915 03/21/19 1020   BP: 123/64 113/72 118/75   Resp: 16 16 14   Temp:   98.9  F (37.2  C)   SpO2: 96% 98% 100%         Electronically Signed By: Jean-Paul Feng DO  March 21, 2019  12:05 PM

## 2019-03-21 NOTE — DISCHARGE INSTRUCTIONS
"HOME CARE FOLLOWING UMBILICAL/VENTRAL HERNIA REPAIR  DONNIE Burleson, ALIZA Jim, ALEXANDER Louie, MOISE Hicks    DIET:  No restrictions.  Increased fluid intake is recommended. While taking pain medications, increase dietary fiber or add a fiber supplementation like Metamucil or Citrucel to help prevent constipation - a possible side effect of pain medications.    NAUSEA:  If nauseated from the anesthetic/pain meds; rest in bed, get up cautiously with assistance, and drink clear liquids (juice, tea, broth).    ACTIVITY:  Light Activity -- you may immediately be up and about as tolerated.  Driving -- you may drive when comfortable and off narcotic pain medications.  Light Work -- resume when comfortable off pain medications.  (If you can drive, you probably can work.)  Strenuous Work/Activity -- limit lifting to 20 pounds for 3 weeks.  Active Sports (running, biking, etc.) -- cautiously resume after 4 weeks.    INCISIONAL CARE:    If you have a dressing in place, keep clean and dry for 48 hours after surgery.  After this timeframe, you may replace the gauze daily if it becomes soiled.    You may remove the dressing and shower 48 hours after surgery.  Do not submerse incision in water for 1 week.    If you have a Dermabond dressing (a type of skin glue), you may shower immediately.    Sutures will absorb and need not be removed.    If present, leave the steri-strips (white paper tapes) in place for 14 days after surgery.    If present, leave Dermabond glue in place until it wears/flakes off.    Expect a variable amount of swelling/bruising/discoloration that may appear around or below the repair site.    Some numbness around the incision is common.    A lump/\"healing ridge\" under the incision is normal and will gradually resolve over the following 1-2 months.    DISCOMFORT:  Local anesthetic placed at surgery should provide relief for 4-8 hours.  Begin taking pain pills before discomfort is severe. "  Take the pain medication with some food, when possible, to minimize side effects.  Intermittent use of ice packs to the hernia repair site may help during the first 1-3 weeks after surgery.  Expect gradual improvement.    Over-the-counter anti-inflammatory medications (i.e. Ibuprofen/Advil/Motrin or Naprosyn/Aleve) may be used per package instructions in addition to or while tapering off the narcotic pain medications to decrease swelling and sensitivity at the repair site.  DO NOT TAKE these Anti-inflammatory medications if your primary physician has advised against doing so, or if you have acid reflux, ulcer, or bleeding disorder, or take blood-thinner medications.  Call your primary physician or the surgery office if you have medication questions.      RETURN APPOINTMENT:  Schedule a follow-up visit 2-3 weeks post-op.  Office Phone:  [phone]     CONTACT US IF THE FOLLOWING DEVELOPS:   1. A fever that is above 101     2. If there is a large amount of drainage, bleeding, or swelling.   3. Severe pain that is not relieved by your prescription.   4. Drainage that is thick, cloudy, yellow, green or white.   5. Any other questions not answered by  Frequently Asked Questions  sheet.      FREQUENTLY ASKED QUESTIONS:    Q:  How should my incision look?    A:  Normally your incision will appear slightly swollen with light redness directly along the incision itself as it heals.  It may feel like a bump or ridge as the healing/scarring happens, and over time (3-4 months) this bump or ridge feeling should slowly go away.  In general, clear or pink watery drainage can be normal at first as your incision heals, but should decrease over time.    Q:  How do I know if my incision is infected?  A:  Look at your incision for signs of infection, like redness around the incision spreading to surrounding skin, or drainage of cloudy or foul-smelling drainage.  If you feel warm, check your temperature to see if you are running a  fever.    **If any of these things occur, please notify the nurse at our office.  We may need you to come into the office for an incision check.      Q:  How do I take care of my incision?  A:  If you have a dressing in place - Starting the day after surgery, replace the dressing 1-2 times a day until there is no further drainage from the incision.  At that time, a dressing is no longer needed.  Try to minimize tape on the skin if irritation is occurring at the tape sites.  If you have significant irritation from tape on the skin, please call the office to discuss other method of dressing your incision.    Small pieces of tape called  steri-strips  may be present directly overlying your incision; these may be removed 10 days after surgery unless otherwise specified by your surgeon.  If these tapes start to loosen at the ends, you may trim them back until they fall off or are removed.    A:  If you had  Dermabond  tissue glue used as a dressing (this causes your incision to look shiny with a clear covering over it) - This type of dressing wears off with time and does not require more dressings over the top unless it is draining around the glue as it wears off.  Do not apply ointments or lotions over the incisions until the glue has completely worn off.    Q:  There is a piece of tape or a sticky  lead  still on my skin.  Can I remove this?  A:  Sometimes the sticky  leads  used for monitoring during surgery or for evaluation in the emergency department are not all removed while you are in the hospital.  These sometimes have a tab or metal dot on them.  You can easily remove these on your own, like taking off a band-aid.  If there is a gel substance under the  lead , simply wipe/clean it off with a washcloth or paper towel.      Q:  What can I do to minimize constipation (very hard stools, or lack of stools)?  A:  Stay well hydrated.  Increase your dietary fiber intake or take a fiber supplement -with plenty of water.   Walk around frequently.  You may consider an over-the-counter stool-softener.  Your Pharmacist can assist you with choosing one that is stocked at your pharmacy.  Constipation is also one of the most common side effects of pain medication.  If you are using pain medication, be pro-active and try to PREVENT problems with constipation by taking the steps above BEFORE constipation becomes a problem.    Q:  What do I do if I need more pain medications?  A:  Call the office to receive refills.  Be aware that certain pain meds cannot be called into a pharmacy and actually require a paper prescription.  A change may be made in your pain med as you progress thru your recovery period or if you have side effects to certain meds.    --Pain meds are NOT refilled after 5pm on weekdays, and NOT AT ALL on the weekends, so please look ahead to prevent problems.      Q:  Why am I having a hard time sleeping now that I am at home?  A:  Many medications you receive while you are in the hospital can impact your sleep for a number of days after your surgery/hospitalization.  Decreased level of activity and naps during the day may also make sleeping at night difficult.  Try to minimize day-time naps, and get up frequently during the day to walk around your home during your recovery time.  Sleep aides may be of some help, but are not recommended for long-term use.      Q:  I am having some back discomfort.  What should I do?  A:  This may be related to certain positioning that was required for your surgery, extended periods of time in bed, or other changes in your overall activity level.  You may try ice, heat, acetaminophen, or ibuprofen to treat this temporarily.  Note that many pain medications have acetaminophen in them and would state this on the prescription bottle.  Be sure not to exceed the maximum of 4000mg per day of acetaminophen.     **If the pain you are having does not resolve, is severe, or is a flare of back pain you have  had on other occasions prior to surgery, please contact your primary physician for further recommendations or for an appointment to be examined at their office.    Q:  Why am I having headaches?  A:  Headaches can be caused by many things:  caffeine withdrawal, use of pain meds, dehydration, high blood pressure, lack of sleep, over-activity/exhaustion, flare-up of usual migraine headaches.  If you feel this is related to muscle tension (a band-like feeling around the head, or a pressure at the low-back of the head) you may try ice or heat to this area.  You may need to drink more fluids (try electrolyte drink like Gatorade), rest, or take your usual migraine medications.   **If your headaches do not resolve, worsen, are accompanied by other symptoms, or if your blood pressure is high, please call your primary physician for recommendation and/or examination.    Q:  I am unable to urinate.  What do I do?  A:  A small percentage of people can have difficulty urinating initially after surgery.  This includes being able to urinate only a very small amount at a time and feeling discomfort or pressure in the very low abdomen.  This is called  urinary retention , and is actually an urgent situation.  Proceed to your nearest Emergency department for evaluation (not an Urgent Care Center).  Sometimes the bladder does not work correctly after certain medications you receive during surgery, or related to certain procedures.  You may need to have a catheter placed until your bladder recovers.  When planning to go to an Emergency department, it may help to call the ER to let them know you are coming in for this problem after a surgery.  This may help you get in quicker to be evaluated.  **If you have symptoms of a urinary tract infection, please contact your primary physician for the proper evaluation and treatment.          If you have other questions, please call the office Monday thru Friday between 8am and 5pm to discuss with  the nurse or physician assistant.  #[phone]    There is a surgeon ON CALL on weekday evenings and over the weekend in case of urgent need only, and may be contacted at the same number.    If you are having an emergency, call 911 or proceed to your nearest emergency department.    GENERAL ANESTHESIA OR SEDATION ADULT DISCHARGE INSTRUCTIONS   SPECIAL PRECAUTIONS FOR 24 HOURS AFTER SURGERY    IT IS NOT UNUSUAL TO FEEL LIGHT-HEADED OR FAINT, UP TO 24 HOURS AFTER SURGERY OR WHILE TAKING PAIN MEDICATION.  IF YOU HAVE THESE SYMPTOMS; SIT FOR A FEW MINUTES BEFORE STANDING AND HAVE SOMEONE ASSIST YOU WHEN YOU GET UP TO WALK OR USE THE BATHROOM.    YOU SHOULD REST AND RELAX FOR THE NEXT 24 HOURS AND YOU MUST MAKE ARRANGEMENTS TO HAVE SOMEONE STAY WITH YOU FOR AT LEAST 24 HOURS AFTER YOUR DISCHARGE.  AVOID HAZARDOUS AND STRENUOUS ACTIVITIES.  DO NOT MAKE IMPORTANT DECISIONS FOR 24 HOURS.    DO NOT DRIVE ANY VEHICLE OR OPERATE MECHANICAL EQUIPMENT FOR 24 HOURS FOLLOWING THE END OF YOUR SURGERY.  EVEN THOUGH YOU MAY FEEL NORMAL, YOUR REACTIONS MAY BE AFFECTED BY THE MEDICATION YOU HAVE RECEIVED.    DO NOT DRINK ALCOHOLIC BEVERAGES FOR 24 HOURS FOLLOWING YOUR SURGERY.    DRINK CLEAR LIQUIDS (APPLE JUICE, GINGER ALE, 7-UP, BROTH, ETC.).  PROGRESS TO YOUR REGULAR DIET AS YOU FEEL ABLE.    YOU MAY HAVE A DRY MOUTH, A SORE THROAT, MUSCLES ACHES OR TROUBLE SLEEPING.  THESE SHOULD GO AWAY AFTER 24 HOURS.    CALL YOUR DOCTOR FOR ANY OF THE FOLLOWING:  SIGNS OF INFECTION (FEVER, GROWING TENDERNESS AT THE SURGERY SITE, A LARGE AMOUNT OF DRAINAGE OR BLEEDING, SEVERE PAIN, FOUL-SMELLING DRAINAGE, REDNESS OR SWELLING.    IT HAS BEEN OVER 8 TO 10 HOURS SINCE SURGERY AND YOU ARE STILL NOT ABLE TO URINATE (PASS WATER).     You received Toradol, an IV form of ibuprofen (Motrin) at 8:00am.  Do not take any ibuprofen products until 2:00pm.

## 2019-03-21 NOTE — ANESTHESIA CARE TRANSFER NOTE
Patient: Keisha Espinoza    Procedure(s):  ventral hernia repair with mesh    Diagnosis: Umbilical hernia  Diagnosis Additional Information: No value filed.    Anesthesia Type:   MAC     Note:  Airway :Face Mask  Patient transferred to:Phase II  Comments: Pt's VSS, A/O x3 resting comfortably post procedure, care continued by RN. Handoff Report: Identifed the Patient, Identified the Reponsible Provider, Reviewed the pertinent medical history, Discussed the surgical course, Reviewed Intra-OP anesthesia mangement and issues during anesthesia, Set expectations for post-procedure period and Allowed opportunity for questions and acknowledgement of understanding      Vitals: (Last set prior to Anesthesia Care Transfer)    CRNA VITALS  3/21/2019 0755 - 3/21/2019 0832      3/21/2019             NIBP:  106/58    Pulse:  62    SpO2:  100 %    Resp Rate (observed):  16    EKG:  Sinus rhythm                Electronically Signed By: TRISTIAN Aguilar CRNA  March 21, 2019  8:32 AM

## 2019-03-26 ENCOUNTER — TELEPHONE (OUTPATIENT)
Dept: SURGERY | Facility: CLINIC | Age: 39
End: 2019-03-26

## 2019-03-26 NOTE — TELEPHONE ENCOUNTER
Post Surgical Routine Follow Up Call -   Keisha Espinoza    MRN# [id number]  AGE:  38 year old  YOB: 1980  [phone] (home) [phone] (work) [place]  Surgeon: Dr. Carrasquillo  Surgical Assist:  Ingrid Robertson PA-C     Surgery type: Ventral hernia repair with preperitoneal mesh     Surgery Date: March / 21 / 2019     POD: 5      Called patient for post op follow up.  Keisha is feeling well today.  Taking Ibuprofen and Tylenol for discomfort.  LBM today. Incision healing well, tiny spot of drainage today.  Afebrile, no redness or increased pain.    Call Summary  Keisha Espinoza  is recommended to contact the clinic if worsening pain, onset of fever/redness at any incision site, or new drainage from the area. Pt also recommended to call office at any time if ongoing questions/concerns during recovery. Pt is in agreement with this plan.  Irma Degroot RN on 3/26/2019 at 3:54 PM

## 2019-03-28 ENCOUNTER — TELEPHONE (OUTPATIENT)
Dept: SURGERY | Facility: CLINIC | Age: 39
End: 2019-03-28

## 2019-03-28 NOTE — LETTER
2019    RE: Keisha Espinoza  :  1980    This is to certify that Keisha Espinoza has been under my care for Hernia surgery.      Patient is NOT able to return to work/school at all from:    to      Patient is able to return to work/school with restrictions from     to  May / 02 / 2019  Restrictions are:     Lift, carry, push, and pull no more than:     15 Ibs Not at all (0 hours)     Patient is able to return to work/school without restrictions as of   May / 03 / 2019    Subject to change due to healing process.  If you have any questions please feel free to call our clinic at [phone] and speak with nursing.    Sincerely,        KATI VANEGAS

## 2019-03-28 NOTE — TELEPHONE ENCOUNTER
GENERAL SURGERY NURSE PHONE TRIAGE   Keisha Espinoza    MRN# [id number]  AGE:  38 year old  YOB: 1980  [phone] (home) [phone]  Surgeon: Dr. Carrasquillo  Surgical Assist:  Ingrid Robertson PA-C     Surgery type: Ventral hernia repair with preperitoneal mesh     Surgery Date: March / 21 / 2019     POD: 7     CHIEF CONCERN:  Bruising     HISTORY OF PRESENT ILLNESS:   Patient calling with concerns re bruising which is inferior to incision.  Patient reassured.  Also needing note for RTW restrictions.    PLAN:   RTW instructions ;      Patient is NOT able to return to work/school at all from:   March / 21 / 2019 to  April / 05 / 2019     Patient is able to return to work/school with restrictions from    April / 08 / 2019 to  May / 02 / 2019  Restrictions are:      Lift, carry, push, and pull no more than:     15 Ibs Not at all (0 hours    Keisha is recommended to contact the clinic if worsening pain, onset of fever/redness at any incision site, or new drainage from the area. Pt also recommended to call office at any time if ongoing questions/concerns during recovery. Pt is in agreement with this plan.  Irma Degroot RN on 3/28/2019 at 4:34 PM

## 2019-04-08 ENCOUNTER — OFFICE VISIT (OUTPATIENT)
Dept: SURGERY | Facility: CLINIC | Age: 39
End: 2019-04-08
Payer: COMMERCIAL

## 2019-04-08 VITALS
WEIGHT: 144 LBS | RESPIRATION RATE: 16 BRPM | DIASTOLIC BLOOD PRESSURE: 72 MMHG | HEIGHT: 64 IN | HEART RATE: 66 BPM | SYSTOLIC BLOOD PRESSURE: 106 MMHG | OXYGEN SATURATION: 97 % | BODY MASS INDEX: 24.59 KG/M2

## 2019-04-08 DIAGNOSIS — Z09 SURGICAL FOLLOWUP VISIT: Primary | ICD-10-CM

## 2019-04-08 PROCEDURE — 99024 POSTOP FOLLOW-UP VISIT: CPT | Performed by: PHYSICIAN ASSISTANT

## 2019-04-08 ASSESSMENT — MIFFLIN-ST. JEOR: SCORE: 1310.24

## 2019-04-08 NOTE — PROGRESS NOTES
4/8/2019    [facility] Note     Subjective:  Keisha Espinoza is here for her first postoperative visit. She underwent  ventral hernia repair with mesh by Dr. Carrasquillo on 3/21/2019. Today she  tells me she has been feeling well since surgery. She currently does not require narcotic pain medications, she is eating a normal diet and her bowels are regular. She has no concerns today.    Objective:  Abd - Abdomen soft, with minimal tenderness at surgical site.   Inc - Healing well, well approximated and without signs of infection.  Dermabond glue intact, brown in color (likely due to mild bleeding at time of application).  No ecchymosis.  + appropriate mass of scar tissue beneath incision.    Assessment:  S/p ventral hernia repair with mesh.     Plan:  RTC PRN      Ingrid Robertson PA-C      Please route or send letter to:  Primary Care Provider (PCP)

## 2019-11-08 ENCOUNTER — HEALTH MAINTENANCE LETTER (OUTPATIENT)
Age: 39
End: 2019-11-08

## 2019-11-26 ENCOUNTER — TELEPHONE (OUTPATIENT)
Dept: FAMILY MEDICINE | Facility: CLINIC | Age: 39
End: 2019-11-26

## 2019-11-26 ENCOUNTER — MYC MEDICAL ADVICE (OUTPATIENT)
Dept: FAMILY MEDICINE | Facility: CLINIC | Age: 39
End: 2019-11-26

## 2019-11-26 NOTE — LETTER
December 11, 2019      Keisha MARTINEZ Espinoza  [address]  [place] [address]        Dear MsAnastacia Espinoza,      We care about your health and have reviewed your health plan including medical conditions, medications, and lab results.  Based on this review, we recommend you take the following action(s):     -schedule an ANNUAL FASTING PHYSICAL EXAM.  This is important for total body health where the provider talks about recommended routine healthcare tips.  To be fasting, you should not eat anything for 10-12 hours ahead of time.  Please be sure to drink plenty of water and take all your normal medications as prescribed.  You can have one cup of black coffee (without cream or sugar), but please do not drink any juice or other liquids besides water.    -schedule a PAP SMEAR EXAM.  This is an important screening for cervical cancer.  Please disregard this reminder if you have had this exam elsewhere within the last year.  It would be helpful for us to have a copy of your recent pap smear report to update your records.         Please schedule an appointment through [employer] or call us at [phone] to address the above recommendations.   Thank you for trusting [facility] and we appreciate the opportunity to serve you.  We look forward to supporting your healthcare needs in the future.     Sincerely,  Kusum Lynch CMA (AAMA)

## 2019-11-26 NOTE — TELEPHONE ENCOUNTER
Type of outreach:  Sent [employer] message.  Health Maintenance Due   Topic Date Due     PHQ-2  01/01/2019     HPV  12/02/2019     PREVENTIVE CARE VISIT  12/18/2019     Was due for flu vaccine, done per [facility], added to chart.  Per previous positive HPV test, advised to repeat in 3 years (+HPV on 11/2016, negative HPV test on 12/2016).    Patient should schedule fasting px and pap after 12/18/19.    Kusum Lynch CMA ([facility])

## 2019-12-03 NOTE — TELEPHONE ENCOUNTER
Type of outreach:  Phone, left message for patient to call back.   Health Maintenance Due   Topic Date Due     PHQ-2  01/01/2019     HPV  12/02/2019     PREVENTIVE CARE VISIT  12/18/2019     2nd attempt, Due for fasting px and pap , AFTER 12/18/19.  Kusum Lynch CMA ([facility])

## 2019-12-11 NOTE — TELEPHONE ENCOUNTER
Type of outreach:  Sent letter.  Health Maintenance Due   Topic Date Due     PHQ-2  01/01/2019     HPV  12/02/2019     PREVENTIVE CARE VISIT  12/18/2019     REN ASSESSMENT  12/18/2019     3rd attempt, Due for fasting px and pap after 12/18/19, REN.  Kusum Lynch CMA ([facility])

## 2020-02-23 ENCOUNTER — HEALTH MAINTENANCE LETTER (OUTPATIENT)
Age: 40
End: 2020-02-23

## 2020-12-06 ENCOUNTER — HEALTH MAINTENANCE LETTER (OUTPATIENT)
Age: 40
End: 2020-12-06

## 2021-04-11 ENCOUNTER — HEALTH MAINTENANCE LETTER (OUTPATIENT)
Age: 41
End: 2021-04-11

## 2021-09-25 ENCOUNTER — HEALTH MAINTENANCE LETTER (OUTPATIENT)
Age: 41
End: 2021-09-25

## 2022-05-07 ENCOUNTER — HEALTH MAINTENANCE LETTER (OUTPATIENT)
Age: 42
End: 2022-05-07

## 2023-04-22 ENCOUNTER — HEALTH MAINTENANCE LETTER (OUTPATIENT)
Age: 43
End: 2023-04-22

## 2024-02-10 ENCOUNTER — HEALTH MAINTENANCE LETTER (OUTPATIENT)
Age: 44
End: 2024-02-10

## 2024-04-29 ENCOUNTER — HOSPITAL ENCOUNTER (EMERGENCY)
Facility: CLINIC | Age: 44
Discharge: HOME | End: 2024-04-29
Attending: EMERGENCY MEDICINE | Admitting: EMERGENCY MEDICINE
Payer: COMMERCIAL

## 2024-04-29 VITALS
HEART RATE: 68 BPM | WEIGHT: 152 LBS | DIASTOLIC BLOOD PRESSURE: 74 MMHG | OXYGEN SATURATION: 99 % | SYSTOLIC BLOOD PRESSURE: 114 MMHG | TEMPERATURE: 98 F | RESPIRATION RATE: 18 BRPM | HEIGHT: 64 IN | BODY MASS INDEX: 25.95 KG/M2

## 2024-04-29 DIAGNOSIS — R07.9 ACUTE CHEST PAIN: ICD-10-CM

## 2024-04-29 LAB
HOLD SPECIMEN: NORMAL
TROPONIN T SERPL HS-MCNC: <6 NG/L

## 2024-04-29 PROCEDURE — 99284 EMERGENCY DEPT VISIT MOD MDM: CPT

## 2024-04-29 PROCEDURE — 84484 ASSAY OF TROPONIN QUANT: CPT | Performed by: EMERGENCY MEDICINE

## 2024-04-29 PROCEDURE — 36415 COLL VENOUS BLD VENIPUNCTURE: CPT | Performed by: EMERGENCY MEDICINE

## 2024-04-29 PROCEDURE — 93005 ELECTROCARDIOGRAM TRACING: CPT

## 2024-04-29 ASSESSMENT — ACTIVITIES OF DAILY LIVING (ADL)
ADLS_ACUITY_SCORE: 35

## 2024-04-29 NOTE — ED TRIAGE NOTES
"Patient reporting chest \"squeezing\" that comes in waves. Reports pain has been ongoing for 2-3 months. Currently wearing a 2 week heart monitor placed by PCP.         "

## 2024-04-29 NOTE — ED PROVIDER NOTES
"  History     Chief Complaint:  Chest Pain       The history is provided by the patient.      Keisha Espinoza is a 44 year old female with history of tobacco abuse, palpitations, and prehypertension who presents to the ED for evaluation of chest pain. Patient reports 2 to 3 months of a \"squeezing\" sensation in her chest. States that it usually goes away, but she does note residual tingling when the chest pain resolves. It was every 2 weeks, now every 2 days. Today, the episode started around 1430 and was worse with palpitations. Episodes usually last 30 seconds. She has been eating and drinking normally. No shortness of breath or nausea with the episodes of chest tightness.  She denies any pleuritic pain.  Denies pain or swelling in the lower extremities, recent travel, recent surgeries, diarrhea, or abdominal pain.  The patient is currently wearing a cardiac monitor and she felt palpitations earlier today so she clicked the alert on the cardiac monitor.  She is currently chest pain-free.     Independent Historian:   None - Patient Only    Review of External Notes:   I reviewed Dr. Elizabeth's 4/17/24 Office Visit note regarding chest pain. D-Dimer <0.22 at that time.  I also reviewed the patient's stress echo results from 4/26/2024 which were negative revealing low risk for cardiac mortality.    Medications:    Albuterol  Benzonatate   Mometasone    Past Medical History:    Tobacco abuse  Palpitations  Sinus bradycardia   Short DE-normal QRS complex syndrome  Umbilical hernia  REN   Melasma  Prehypertension   Myopia   External hemorrhoids     Past Surgical History:    Head & neck surgery  Umbilical herniorrhaphy     Physical Exam   Patient Vitals for the past 24 hrs:   BP Temp Temp src Pulse Resp SpO2 Height Weight   04/29/24 2033 114/74 -- -- 68 18 99 % -- --   04/29/24 1616 (!) 155/92 98  F (36.7  C) Oral 64 16 100 % 1.626 m (5' 4\") 68.9 kg (152 lb)        Physical Exam    Nursing note and vitals " reviewed.  Constitutional:  Appears well-developed and well-nourished.   HENT:   Head:    Atraumatic.   Mouth/Throat:   Oropharynx is clear and moist. No oropharyngeal exudate.   Eyes:    Pupils are equal, round, and reactive to light.   Neck:    Normal range of motion. Neck supple.      No tracheal deviation present. No thyromegaly present.   Cardiovascular:  Normal rate, regular rhythm, no murmur   Pulmonary/Chest: Nontender chest wall pain breath sounds are clear and equal without wheezes or crackles.  Abdominal:   Soft. Bowel sounds are normal. Exhibits no distension and      no mass. There is no tenderness.      There is no rebound and no guarding.   Musculoskeletal:  Exhibits no edema.   Lymphadenopathy:  No cervical adenopathy.   Neurological:   Alert and oriented to person, place, and time.   Skin:    Skin is warm and dry. No rash noted. No pallor.      Emergency Department Course   ECG    ECG results from 04/29/24   EKG 12-lead, tracing only     Value    Systolic Blood Pressure     Diastolic Blood Pressure     Ventricular Rate 54    Atrial Rate 54    TN Interval 150    QRS Duration 88        QTc 415    P Axis 58    R AXIS 36    T Axis 22    Interpretation ECG      Sinus bradycardia  Otherwise normal ECG  When compared with ECG of 27-FEB-2015 10:49,  No significant change was found  Taken at 1624. Read at 1727 by Alma Rosa Cooper MD        Laboratory:  Labs Ordered and Resulted from Time of ED Arrival to Time of ED Departure   TROPONIN T, HIGH SENSITIVITY - Normal       Result Value    Troponin T, High Sensitivity <6          Procedures   None     Emergency Department Course & Assessments:    Interventions:  Medications - No data to display     Assessments:  1734 I obtained patient history and performed a physical exam.     Independent Interpretation (X-rays, CTs, rhythm strip):  None    Consultations/Discussion of Management or Tests:  None   ED Course as of 04/29/24 2151 Mon Apr 29, 2024   1734  I obtained patient history and performed a physical exam.    2030 I rechecked the patient and explained findings.        Social Determinants of Health affecting care:   None    Disposition:  The patient was discharged.     Impression & Plan      Medical Decision Making:  This patient has been having brief intermittent episodes of chest pain over the past several months and has had a recent negative stress echo 3 days ago.  I performed a troponin which was undetectable and ECG did not show any sign of cardiac arrhythmia or ischemic change.  She had a recent negative D-dimer for evaluation of the same chest pain which is not pleuritic and she denies shortness of breath and she is not tachycardic or hypoxic so I did not feel that PE workup was indicated today.  Her chest pain is resolved but was associated with palpitations and she is wearing a cardiac monitor which will hopefully reveal if there was any palpitations such as PVCs or small run of a cardiac arrhythmia which has been potentially the cause of her symptoms, however she has not shown any cardiac arrhythmia here in the emergency department.  I did not have any concern for aortic dissection, pneumothorax, pneumonia or pericarditis.  [place] she be safely discharged home and she was instructed follow-up with her primary care physician or cardiologist this week.  She was instructed return if symptoms worsen or for any other concerning problems.    Diagnosis:    ICD-10-CM    1. Acute chest pain  R07.9            Discharge Medications:  Discharge Medication List as of 4/29/2024  8:29 PM             Scribe Disclosure:  I, Corin Cuevas, am serving as a scribe at 7:39 PM on 4/29/2024 to document services personally performed by Alma Rosa Cooper MD based on my observations and the provider's statements to me.   4/29/2024   Alma Rosa Cooper MD [place], Cheri Lee, MD  04/30/24 0158

## 2024-04-30 LAB
ATRIAL RATE - MUSE: 54 BPM
DIASTOLIC BLOOD PRESSURE - MUSE: NORMAL MMHG
INTERPRETATION ECG - MUSE: NORMAL
P AXIS - MUSE: 58 DEGREES
PR INTERVAL - MUSE: 150 MS
QRS DURATION - MUSE: 88 MS
QT - MUSE: 438 MS
QTC - MUSE: 415 MS
R AXIS - MUSE: 36 DEGREES
SYSTOLIC BLOOD PRESSURE - MUSE: NORMAL MMHG
T AXIS - MUSE: 22 DEGREES
VENTRICULAR RATE- MUSE: 54 BPM

## 2025-08-19 ENCOUNTER — PATIENT OUTREACH (OUTPATIENT)
Dept: CARE COORDINATION | Facility: CLINIC | Age: 45
End: 2025-08-19
Payer: COMMERCIAL

## (undated) DEVICE — SU PDS II 0 CT-2 27" Z334H

## (undated) DEVICE — PREP SCRUB SOL EXIDINE 4% CHG 4OZ 29002-404

## (undated) DEVICE — DRAPE LAP W/ARMBOARD 29410

## (undated) DEVICE — BNDG ABDOMINAL BINDER 9X45-62" 79-89071

## (undated) DEVICE — PREP SKIN SCRUB TRAY 4461A

## (undated) DEVICE — GLOVE PROTEXIS BLUE W/NEU-THERA 8.0  2D73EB80

## (undated) DEVICE — SU VICRYL 3-0 SH 27" J316H

## (undated) DEVICE — NDL 22GA 1.5"

## (undated) DEVICE — LINEN TOWEL PACK X10 5473

## (undated) DEVICE — ESU GROUND PAD ADULT W/CORD E7507

## (undated) DEVICE — LINEN TOWEL PACK X5 5464

## (undated) DEVICE — GLOVE PROTEXIS W/NEU-THERA 7.5  2D73TE75

## (undated) DEVICE — LINEN HALF SHEET 5512

## (undated) DEVICE — LINEN FULL SHEET 5511

## (undated) DEVICE — BAG CLEAR TRASH 1.3M 39X33" P4040C

## (undated) DEVICE — CLEANSER JET LAVAGE IRRISEPT 0.05% CHG IRRISEPT45USA

## (undated) DEVICE — SU VICRYL 4-0 P-3 18" UND J494G

## (undated) DEVICE — PACK MINOR CUSTOM RIDGES SBA32RMRMA

## (undated) RX ORDER — LIDOCAINE HYDROCHLORIDE 10 MG/ML
INJECTION, SOLUTION EPIDURAL; INFILTRATION; INTRACAUDAL; PERINEURAL
Status: DISPENSED
Start: 2019-03-21

## (undated) RX ORDER — FENTANYL CITRATE 50 UG/ML
INJECTION, SOLUTION INTRAMUSCULAR; INTRAVENOUS
Status: DISPENSED
Start: 2019-03-21

## (undated) RX ORDER — DEXAMETHASONE SODIUM PHOSPHATE 4 MG/ML
INJECTION, SOLUTION INTRA-ARTICULAR; INTRALESIONAL; INTRAMUSCULAR; INTRAVENOUS; SOFT TISSUE
Status: DISPENSED
Start: 2019-03-21

## (undated) RX ORDER — PROPOFOL 10 MG/ML
INJECTION, EMULSION INTRAVENOUS
Status: DISPENSED
Start: 2019-03-21

## (undated) RX ORDER — CEFAZOLIN SODIUM 2 G/100ML
INJECTION, SOLUTION INTRAVENOUS
Status: DISPENSED
Start: 2019-03-21

## (undated) RX ORDER — GLYCOPYRROLATE 0.2 MG/ML
INJECTION INTRAMUSCULAR; INTRAVENOUS
Status: DISPENSED
Start: 2019-03-21

## (undated) RX ORDER — LIDOCAINE HYDROCHLORIDE AND EPINEPHRINE 5; 5 MG/ML; UG/ML
INJECTION, SOLUTION INFILTRATION; PERINEURAL
Status: DISPENSED
Start: 2019-03-21

## (undated) RX ORDER — ONDANSETRON 2 MG/ML
INJECTION INTRAMUSCULAR; INTRAVENOUS
Status: DISPENSED
Start: 2019-03-21